# Patient Record
Sex: FEMALE | Race: WHITE | Employment: OTHER | URBAN - METROPOLITAN AREA
[De-identification: names, ages, dates, MRNs, and addresses within clinical notes are randomized per-mention and may not be internally consistent; named-entity substitution may affect disease eponyms.]

---

## 2017-05-12 PROBLEM — M17.0 PRIMARY OSTEOARTHRITIS OF BOTH KNEES: Status: ACTIVE | Noted: 2017-05-12

## 2017-08-04 ENCOUNTER — HOSPITAL ENCOUNTER (OUTPATIENT)
Dept: MRI IMAGING | Facility: HOSPITAL | Age: 67
Discharge: HOME | End: 2017-08-04
Attending: ORTHOPAEDIC SURGERY
Payer: MEDICARE

## 2017-08-04 DIAGNOSIS — S43.402A SPRAIN OF LEFT SHOULDER: ICD-10-CM

## 2017-08-15 ENCOUNTER — HOSPITAL ENCOUNTER (OUTPATIENT)
Dept: MRI IMAGING | Age: 67
Discharge: HOME | End: 2017-08-15
Attending: ORTHOPAEDIC SURGERY
Payer: MEDICARE

## 2017-08-15 PROCEDURE — 73221 MRI JOINT UPR EXTREM W/O DYE: CPT | Performed by: ORTHOPAEDIC SURGERY

## 2017-08-16 NOTE — PROGRESS NOTES
Left shoulder with full thickness rotator cuff tear. Will discuss treatment options in follow up visit. May require surgical repair if no improvement with current treatments.

## 2017-08-31 ENCOUNTER — SURGERY (OUTPATIENT)
Age: 67
End: 2017-08-31

## 2017-08-31 ENCOUNTER — HOSPITAL ENCOUNTER (OUTPATIENT)
Facility: HOSPITAL | Age: 67
Setting detail: HOSPITAL OUTPATIENT SURGERY
Discharge: HOME | End: 2017-08-31
Attending: ORTHOPAEDIC SURGERY | Admitting: ORTHOPAEDIC SURGERY
Payer: MEDICARE

## 2017-08-31 ENCOUNTER — ANESTHESIA (OUTPATIENT)
Dept: SURGERY | Facility: HOSPITAL | Age: 67
End: 2017-08-31

## 2017-08-31 ENCOUNTER — ANESTHESIA EVENT (OUTPATIENT)
Dept: SURGERY | Facility: HOSPITAL | Age: 67
End: 2017-08-31

## 2017-08-31 VITALS
HEART RATE: 77 BPM | SYSTOLIC BLOOD PRESSURE: 129 MMHG | HEIGHT: 64 IN | BODY MASS INDEX: 50.02 KG/M2 | RESPIRATION RATE: 16 BRPM | TEMPERATURE: 98 F | DIASTOLIC BLOOD PRESSURE: 62 MMHG | OXYGEN SATURATION: 94 % | WEIGHT: 293 LBS

## 2017-08-31 DIAGNOSIS — M12.812 ROTATOR CUFF ARTHROPATHY, LEFT: ICD-10-CM

## 2017-08-31 PROCEDURE — 76942 ECHO GUIDE FOR BIOPSY: CPT | Performed by: ORTHOPAEDIC SURGERY

## 2017-08-31 PROCEDURE — 3E0T3BZ INTRODUCTION OF ANESTHETIC AGENT INTO PERIPHERAL NERVES AND PLEXI, PERCUTANEOUS APPROACH: ICD-10-PCS | Performed by: ANESTHESIOLOGY

## 2017-08-31 PROCEDURE — 99152 MOD SED SAME PHYS/QHP 5/>YRS: CPT | Performed by: ORTHOPAEDIC SURGERY

## 2017-08-31 PROCEDURE — 0LQ20ZZ REPAIR LEFT SHOULDER TENDON, OPEN APPROACH: ICD-10-PCS | Performed by: ORTHOPAEDIC SURGERY

## 2017-08-31 PROCEDURE — 64415 NJX AA&/STRD BRCH PLXS IMG: CPT | Performed by: ORTHOPAEDIC SURGERY

## 2017-08-31 DEVICE — ANCHOR SUT 5.5MM 2 FT CRKSCR 2: Type: IMPLANTABLE DEVICE | Site: SHOULDER | Status: FUNCTIONAL

## 2017-08-31 DEVICE — ANCHOR SWIVELOCK AR-2324BCC: Type: IMPLANTABLE DEVICE | Site: SHOULDER | Status: FUNCTIONAL

## 2017-08-31 RX ORDER — CLINDAMYCIN PHOSPHATE 150 MG/ML
INJECTION, SOLUTION INTRAVENOUS AS NEEDED
Status: DISCONTINUED | OUTPATIENT
Start: 2017-08-31 | End: 2017-08-31 | Stop reason: SURG

## 2017-08-31 RX ORDER — CLINDAMYCIN PHOSPHATE 900 MG/50ML
900 INJECTION INTRAVENOUS ONCE
Status: DISCONTINUED | OUTPATIENT
Start: 2017-08-31 | End: 2017-08-31 | Stop reason: HOSPADM

## 2017-08-31 RX ORDER — NALOXONE HYDROCHLORIDE 0.4 MG/ML
80 INJECTION, SOLUTION INTRAMUSCULAR; INTRAVENOUS; SUBCUTANEOUS AS NEEDED
Status: DISCONTINUED | OUTPATIENT
Start: 2017-08-31 | End: 2017-08-31

## 2017-08-31 RX ORDER — ACETAMINOPHEN 325 MG/1
650 TABLET ORAL ONCE
Status: COMPLETED | OUTPATIENT
Start: 2017-08-31 | End: 2017-08-31

## 2017-08-31 RX ORDER — MORPHINE SULFATE 2 MG/ML
2 INJECTION, SOLUTION INTRAMUSCULAR; INTRAVENOUS EVERY 10 MIN PRN
Status: DISCONTINUED | OUTPATIENT
Start: 2017-08-31 | End: 2017-08-31

## 2017-08-31 RX ORDER — HYDROMORPHONE HYDROCHLORIDE 1 MG/ML
0.2 INJECTION, SOLUTION INTRAMUSCULAR; INTRAVENOUS; SUBCUTANEOUS EVERY 5 MIN PRN
Status: DISCONTINUED | OUTPATIENT
Start: 2017-08-31 | End: 2017-08-31

## 2017-08-31 RX ORDER — ROPIVACAINE HYDROCHLORIDE 5 MG/ML
INJECTION, SOLUTION EPIDURAL; INFILTRATION; PERINEURAL AS NEEDED
Status: DISCONTINUED | OUTPATIENT
Start: 2017-08-31 | End: 2017-08-31 | Stop reason: SURG

## 2017-08-31 RX ORDER — METOCLOPRAMIDE 10 MG/1
10 TABLET ORAL ONCE
Status: COMPLETED | OUTPATIENT
Start: 2017-08-31 | End: 2017-08-31

## 2017-08-31 RX ORDER — BUPIVACAINE HYDROCHLORIDE AND EPINEPHRINE 5; 5 MG/ML; UG/ML
INJECTION, SOLUTION PERINEURAL AS NEEDED
Status: DISCONTINUED | OUTPATIENT
Start: 2017-08-31 | End: 2017-08-31 | Stop reason: HOSPADM

## 2017-08-31 RX ORDER — HYDROCODONE BITARTRATE AND ACETAMINOPHEN 5; 325 MG/1; MG/1
1 TABLET ORAL AS NEEDED
Status: DISCONTINUED | OUTPATIENT
Start: 2017-08-31 | End: 2017-08-31

## 2017-08-31 RX ORDER — MORPHINE SULFATE 4 MG/ML
6 INJECTION, SOLUTION INTRAMUSCULAR; INTRAVENOUS EVERY 10 MIN PRN
Status: DISCONTINUED | OUTPATIENT
Start: 2017-08-31 | End: 2017-08-31

## 2017-08-31 RX ORDER — HYDROMORPHONE HYDROCHLORIDE 1 MG/ML
0.6 INJECTION, SOLUTION INTRAMUSCULAR; INTRAVENOUS; SUBCUTANEOUS EVERY 5 MIN PRN
Status: DISCONTINUED | OUTPATIENT
Start: 2017-08-31 | End: 2017-08-31

## 2017-08-31 RX ORDER — SUCCINYLCHOLINE CHLORIDE 20 MG/ML
INJECTION INTRAMUSCULAR; INTRAVENOUS AS NEEDED
Status: DISCONTINUED | OUTPATIENT
Start: 2017-08-31 | End: 2017-08-31 | Stop reason: SURG

## 2017-08-31 RX ORDER — FAMOTIDINE 20 MG/1
20 TABLET ORAL ONCE
Status: COMPLETED | OUTPATIENT
Start: 2017-08-31 | End: 2017-08-31

## 2017-08-31 RX ORDER — LIDOCAINE HYDROCHLORIDE 10 MG/ML
INJECTION, SOLUTION EPIDURAL; INFILTRATION; INTRACAUDAL; PERINEURAL AS NEEDED
Status: DISCONTINUED | OUTPATIENT
Start: 2017-08-31 | End: 2017-08-31 | Stop reason: SURG

## 2017-08-31 RX ORDER — HYDROMORPHONE HYDROCHLORIDE 1 MG/ML
0.4 INJECTION, SOLUTION INTRAMUSCULAR; INTRAVENOUS; SUBCUTANEOUS EVERY 5 MIN PRN
Status: DISCONTINUED | OUTPATIENT
Start: 2017-08-31 | End: 2017-08-31

## 2017-08-31 RX ORDER — SODIUM CHLORIDE, SODIUM LACTATE, POTASSIUM CHLORIDE, CALCIUM CHLORIDE 600; 310; 30; 20 MG/100ML; MG/100ML; MG/100ML; MG/100ML
INJECTION, SOLUTION INTRAVENOUS CONTINUOUS
Status: DISCONTINUED | OUTPATIENT
Start: 2017-08-31 | End: 2017-08-31

## 2017-08-31 RX ORDER — DEXAMETHASONE SODIUM PHOSPHATE 4 MG/ML
VIAL (ML) INJECTION AS NEEDED
Status: DISCONTINUED | OUTPATIENT
Start: 2017-08-31 | End: 2017-08-31 | Stop reason: SURG

## 2017-08-31 RX ORDER — HALOPERIDOL 5 MG/ML
0.25 INJECTION INTRAMUSCULAR ONCE AS NEEDED
Status: DISCONTINUED | OUTPATIENT
Start: 2017-08-31 | End: 2017-08-31

## 2017-08-31 RX ORDER — HYDROCODONE BITARTRATE AND ACETAMINOPHEN 5; 325 MG/1; MG/1
2 TABLET ORAL AS NEEDED
Status: DISCONTINUED | OUTPATIENT
Start: 2017-08-31 | End: 2017-08-31

## 2017-08-31 RX ORDER — ONDANSETRON 2 MG/ML
4 INJECTION INTRAMUSCULAR; INTRAVENOUS ONCE AS NEEDED
Status: DISCONTINUED | OUTPATIENT
Start: 2017-08-31 | End: 2017-08-31

## 2017-08-31 RX ORDER — ONDANSETRON 2 MG/ML
INJECTION INTRAMUSCULAR; INTRAVENOUS AS NEEDED
Status: DISCONTINUED | OUTPATIENT
Start: 2017-08-31 | End: 2017-08-31 | Stop reason: SURG

## 2017-08-31 RX ORDER — MORPHINE SULFATE 4 MG/ML
4 INJECTION, SOLUTION INTRAMUSCULAR; INTRAVENOUS EVERY 10 MIN PRN
Status: DISCONTINUED | OUTPATIENT
Start: 2017-08-31 | End: 2017-08-31

## 2017-08-31 RX ADMIN — DEXAMETHASONE SODIUM PHOSPHATE 4 MG: 4 MG/ML VIAL (ML) INJECTION at 12:25:00

## 2017-08-31 RX ADMIN — LIDOCAINE HYDROCHLORIDE 50 MG: 10 INJECTION, SOLUTION EPIDURAL; INFILTRATION; INTRACAUDAL; PERINEURAL at 12:05:00

## 2017-08-31 RX ADMIN — ONDANSETRON 4 MG: 2 INJECTION INTRAMUSCULAR; INTRAVENOUS at 12:25:00

## 2017-08-31 RX ADMIN — ROPIVACAINE HYDROCHLORIDE 30 ML: 5 INJECTION, SOLUTION EPIDURAL; INFILTRATION; PERINEURAL at 11:54:00

## 2017-08-31 RX ADMIN — CLINDAMYCIN PHOSPHATE 900 MG: 150 INJECTION, SOLUTION INTRAVENOUS at 12:09:00

## 2017-08-31 RX ADMIN — SODIUM CHLORIDE, SODIUM LACTATE, POTASSIUM CHLORIDE, CALCIUM CHLORIDE: 600; 310; 30; 20 INJECTION, SOLUTION INTRAVENOUS at 11:56:00

## 2017-08-31 RX ADMIN — SODIUM CHLORIDE, SODIUM LACTATE, POTASSIUM CHLORIDE, CALCIUM CHLORIDE: 600; 310; 30; 20 INJECTION, SOLUTION INTRAVENOUS at 13:10:00

## 2017-08-31 RX ADMIN — SUCCINYLCHOLINE CHLORIDE 100 MG: 20 INJECTION INTRAMUSCULAR; INTRAVENOUS at 12:05:00

## 2017-08-31 NOTE — ANESTHESIA POSTPROCEDURE EVALUATION
Patient: Aisha Murillo    Procedure Summary     Date:  08/31/17 Room / Location:  [address] [place] [address] MAIN OR 12 / [address] [place] [address] OR    Anesthesia Start:  7726 Anesthesia Stop:      Procedure:  SHOULDER ROTATOR CUFF REPAIR (Left Shoulder) Diagnosis:       Rotator cuff arthro

## 2017-08-31 NOTE — INTERVAL H&P NOTE
Pre-op Diagnosis: Rotator cuff arthropathy, left [M12.812]    The above referenced H&P was reviewed by Cara Maravilla MD on 8/31/2017, the patient was examined and no significant changes have occurred in the patient's condition since the H&P was perfor

## 2017-08-31 NOTE — OPERATIVE REPORT
[facility]    PATIENT'S NAME: DOMINGO DUVAL   ATTENDING PHYSICIAN: Isai Tubbs MD   OPERATING PHYSICIAN: Isai Tubbs MD   PATIENT ACCOUNT#:   [id number]    LOCATION:  [facility] 10  MEDICAL RECORD #:   [id number] upper extremity was prepped and draped in the usual sterile fashion. The procedure was begun with a 4 cm incision over the lateral aspect of the left shoulder starting at just above the acromion process and extending 3 cm distally.   Skin dissection was ta gently compressive dressing was applied, and a sling was applied as she was being awakened. The patient has been transferred to the recovery room in stable condition. Dictated By Lakisha Grace.  Bunny Walker MD  d: 08/31/2017 13:11:53  t: 08/31/2017 16:49:56  J

## 2017-08-31 NOTE — BRIEF OP NOTE
Pre-Operative Diagnosis: Rotator cuff arthropathy, left [M12.812]     Post-Operative Diagnosis: ROTATOR CUFF ARTHROPATHY LEFT     Procedure Performed:   Procedure(s):  LEFT SHOULDER MINI OPEN ROTATOR CUFF REPAIR    Surgeon(s) and Role:     * Shanika March

## 2017-08-31 NOTE — ANESTHESIA PREPROCEDURE EVALUATION
Anesthesia PreOp Note    HPI:     Yovany Andrews is a 74year old female who presents for preoperative consultation requested by: Marisa Sims MD    Date of Surgery: 8/31/2017    Procedure(s):  SHOULDER ROTATOR CUFF REPAIR  Indication: Rotator cuf daily. Disp: 1 tablet Rfl: 0 8/23/2017   Multiple Vitamin (MULTIVITAMINS) Oral Cap Take 1 capsule by mouth daily. Disp:  Rfl:  8/23/2017   ibuprofen (MOTRIN) 200 MG Oral Tab Take  by mouth.  Disp:  Rfl:  8/23/2017   HYDROcodone-acetaminophen  MG Ora blood pressure is 153/85 and her pulse is 79. Her respiration is 16 and oxygen saturation is 95%.     08/31/17  1117 08/31/17  1127 08/31/17  1137 08/31/17  1147   BP: (!) 174/77 130/71 135/71 153/85   BP Location: Right arm Right arm Right arm    Pulse: 75

## 2017-08-31 NOTE — ANESTHESIA PROCEDURE NOTES
Peripheral Block    Anesthesiologist:  Britta De Anda  Performed by:   Anesthesiologist  Patient Location:  PACU  Start Time:  8/31/2017 11:49 AM  End Time:  8/31/2017 11:54 AM  Site Identification: ultrasound guided, real time ultrasound guided, nerve stimu

## 2017-09-25 ENCOUNTER — PATIENT OUTREACH (OUTPATIENT)
Dept: INTERNAL MEDICINE CLINIC | Facility: CLINIC | Age: 67
End: 2017-09-25

## 2017-09-25 ENCOUNTER — HOSPITAL ENCOUNTER (OUTPATIENT)
Dept: PHYSICAL THERAPY | Facility: HOSPITAL | Age: 67
Setting detail: THERAPIES SERIES
Discharge: HOME | End: 2017-09-25
Attending: ORTHOPAEDIC SURGERY
Payer: MEDICARE

## 2017-09-25 DIAGNOSIS — Z98.890 STATUS POST LEFT ROTATOR CUFF REPAIR: ICD-10-CM

## 2017-09-25 PROCEDURE — 97110 THERAPEUTIC EXERCISES: CPT

## 2017-09-25 PROCEDURE — 97162 PT EVAL MOD COMPLEX 30 MIN: CPT

## 2017-09-25 NOTE — PROGRESS NOTES
POST-OP SHOULDER EVALUATION:   Referring Physician: Dr. Bunny Walker  Diagnosis: s/p L mini open RC repair    Date of Service: 9/25/2017     PATIENT Marleny Richardson is a 69year old y/o female who presents to therapy today s/p L mini open RC repair Flexion: R nt; L 90  Abduction: R nt; L 90  ER: R nt; L 30-0 deg abd  IR: R nt; L 45       Accessory motion: muscle guarding L GH joint      Strength/MMT:  Shoulder   Flexion: R 5/5; L nt/5  Abduction: R 5/5; L nt/5  ER: R 5/5; L nt/5  IR: R 5/5; L nt/5 Therapy; Therapeutic Exercises; Neuromuscular Re-education; Therapeutic Activity; Electrical Stim; Ultrasound; Pt education; Home exercise program instructions.     Education or treatment limitation: None  Rehab Potential:good    FOTO: 35/100  Current statu

## 2017-09-27 ENCOUNTER — HOSPITAL ENCOUNTER (OUTPATIENT)
Dept: PHYSICAL THERAPY | Facility: HOSPITAL | Age: 67
Setting detail: THERAPIES SERIES
Discharge: HOME | End: 2017-09-27
Attending: ORTHOPAEDIC SURGERY
Payer: MEDICARE

## 2017-09-27 PROCEDURE — 97140 MANUAL THERAPY 1/> REGIONS: CPT

## 2017-09-27 PROCEDURE — 97110 THERAPEUTIC EXERCISES: CPT

## 2017-09-27 NOTE — ADDENDUM NOTE
Encounter addended by: Pedro Mary PT on: 9/27/2017 12:32 PM<BR>    Actions taken: Sign clinical note

## 2017-09-27 NOTE — PROGRESS NOTES
Dx: s/p L mini open RC repair    Authorized # of Visits:  8 (POC 12)       Next MD visit: none scheduled  Fall Risk: standard         Precautions: n/a             Subjective: Pain 5/10. Still having difficulty with sleeping.     Objective:   PROM

## 2017-09-28 ENCOUNTER — OFFICE VISIT (OUTPATIENT)
Dept: INTERNAL MEDICINE CLINIC | Facility: CLINIC | Age: 67
End: 2017-09-28

## 2017-09-28 ENCOUNTER — LAB ENCOUNTER (OUTPATIENT)
Dept: LAB | Facility: HOSPITAL | Age: 67
End: 2017-09-28
Attending: INTERNAL MEDICINE
Payer: MEDICARE

## 2017-09-28 VITALS
BODY MASS INDEX: 50.02 KG/M2 | TEMPERATURE: 98 F | WEIGHT: 293 LBS | RESPIRATION RATE: 20 BRPM | SYSTOLIC BLOOD PRESSURE: 132 MMHG | HEIGHT: 64 IN | DIASTOLIC BLOOD PRESSURE: 80 MMHG | HEART RATE: 70 BPM

## 2017-09-28 DIAGNOSIS — Z12.11 COLON CANCER SCREENING: ICD-10-CM

## 2017-09-28 DIAGNOSIS — E66.9 OBESITY, UNSPECIFIED OBESITY SEVERITY, UNSPECIFIED OBESITY TYPE: ICD-10-CM

## 2017-09-28 DIAGNOSIS — I10 ESSENTIAL HYPERTENSION WITH GOAL BLOOD PRESSURE LESS THAN 140/90: ICD-10-CM

## 2017-09-28 DIAGNOSIS — M17.0 PRIMARY OSTEOARTHRITIS OF BOTH KNEES: ICD-10-CM

## 2017-09-28 DIAGNOSIS — G47.30 SLEEP APNEA, UNSPECIFIED TYPE: ICD-10-CM

## 2017-09-28 DIAGNOSIS — Z00.00 ENCOUNTER FOR ANNUAL HEALTH EXAMINATION: Primary | ICD-10-CM

## 2017-09-28 DIAGNOSIS — M75.102 TEAR OF LEFT ROTATOR CUFF, UNSPECIFIED TEAR EXTENT: ICD-10-CM

## 2017-09-28 LAB
ALBUMIN SERPL BCP-MCNC: 3.8 G/DL (ref 3.5–4.8)
ALBUMIN/GLOB SERPL: 1.2 {RATIO} (ref 1–2)
ALP SERPL-CCNC: 59 U/L (ref 32–100)
ALT SERPL-CCNC: 15 U/L (ref 14–54)
ANION GAP SERPL CALC-SCNC: 10 MMOL/L (ref 0–18)
AST SERPL-CCNC: 20 U/L (ref 15–41)
BASOPHILS # BLD: 0 K/UL (ref 0–0.2)
BASOPHILS NFR BLD: 1 %
BILIRUB SERPL-MCNC: 0.9 MG/DL (ref 0.3–1.2)
BUN SERPL-MCNC: 12 MG/DL (ref 8–20)
BUN/CREAT SERPL: 16.2 (ref 10–20)
CALCIUM SERPL-MCNC: 9.7 MG/DL (ref 8.5–10.5)
CHLORIDE SERPL-SCNC: 100 MMOL/L (ref 95–110)
CHOLEST SERPL-MCNC: 171 MG/DL (ref 110–200)
CO2 SERPL-SCNC: 31 MMOL/L (ref 22–32)
CREAT SERPL-MCNC: 0.74 MG/DL (ref 0.5–1.5)
EOSINOPHIL # BLD: 0.3 K/UL (ref 0–0.7)
EOSINOPHIL NFR BLD: 3 %
ERYTHROCYTE [DISTWIDTH] IN BLOOD BY AUTOMATED COUNT: 13.8 % (ref 11–15)
GLOBULIN PLAS-MCNC: 3.2 G/DL (ref 2.5–3.7)
GLUCOSE SERPL-MCNC: 99 MG/DL (ref 70–99)
HCT VFR BLD AUTO: 44.2 % (ref 35–48)
HDLC SERPL-MCNC: 56 MG/DL
HGB BLD-MCNC: 15 G/DL (ref 12–16)
LDLC SERPL CALC-MCNC: 101 MG/DL (ref 0–99)
LYMPHOCYTES # BLD: 1.7 K/UL (ref 1–4)
LYMPHOCYTES NFR BLD: 19 %
MCH RBC QN AUTO: 30.2 PG (ref 27–32)
MCHC RBC AUTO-ENTMCNC: 34 G/DL (ref 32–37)
MCV RBC AUTO: 88.7 FL (ref 80–100)
MONOCYTES # BLD: 0.8 K/UL (ref 0–1)
MONOCYTES NFR BLD: 8 %
NEUTROPHILS # BLD AUTO: 6.3 K/UL (ref 1.8–7.7)
NEUTROPHILS NFR BLD: 69 %
NONHDLC SERPL-MCNC: 115 MG/DL
OSMOLALITY UR CALC.SUM OF ELEC: 292 MOSM/KG (ref 275–295)
PLATELET # BLD AUTO: 183 K/UL (ref 140–400)
PMV BLD AUTO: 8.5 FL (ref 7.4–10.3)
POTASSIUM SERPL-SCNC: 3.4 MMOL/L (ref 3.3–5.1)
PROT SERPL-MCNC: 7 G/DL (ref 5.9–8.4)
RBC # BLD AUTO: 4.98 M/UL (ref 3.7–5.4)
SODIUM SERPL-SCNC: 141 MMOL/L (ref 136–144)
TRIGL SERPL-MCNC: 69 MG/DL (ref 1–149)
TSH SERPL-ACNC: 2.42 UIU/ML (ref 0.45–5.33)
WBC # BLD AUTO: 9.1 K/UL (ref 4–11)

## 2017-09-28 PROCEDURE — 80061 LIPID PANEL: CPT

## 2017-09-28 PROCEDURE — 84443 ASSAY THYROID STIM HORMONE: CPT

## 2017-09-28 PROCEDURE — 80053 COMPREHEN METABOLIC PANEL: CPT

## 2017-09-28 PROCEDURE — 85025 COMPLETE CBC W/AUTO DIFF WBC: CPT

## 2017-09-28 PROCEDURE — 36415 COLL VENOUS BLD VENIPUNCTURE: CPT

## 2017-09-28 PROCEDURE — G0439 PPPS, SUBSEQ VISIT: HCPCS | Performed by: INTERNAL MEDICINE

## 2017-09-28 RX ORDER — VALSARTAN AND HYDROCHLOROTHIAZIDE 320; 25 MG/1; MG/1
1 TABLET, FILM COATED ORAL DAILY
Qty: 90 TABLET | Refills: 3 | Status: SHIPPED | OUTPATIENT
Start: 2017-09-28 | End: 2018-10-01

## 2017-09-28 RX ORDER — INFLUENZA A VIRUSA/MICHIGAN/45/2015 X-275 (H1N1) ANTIGEN (FORMALDEHYDE INACTIVATED), INFLUENZA A VIRUS A/HONG KONG/4801/2014 X-263B (H3N2) ANTIGEN (FORMALDEHYDE INACTIVATED), AND INFLUENZA B VIRUS B/BRISBANE/60/2008 ANTIGEN (FORMALDEHYDE INACTIVATED) 60; 60; 60 UG/.5ML; UG/.5ML; UG/.5ML
INJECTION, SUSPENSION INTRAMUSCULAR
Refills: 0 | COMMUNITY
Start: 2017-09-26 | End: 2017-09-28 | Stop reason: ALTCHOICE

## 2017-09-28 RX ORDER — AMLODIPINE BESYLATE 10 MG/1
10 TABLET ORAL DAILY
Qty: 90 TABLET | Refills: 3 | Status: SHIPPED | OUTPATIENT
Start: 2017-09-28 | End: 2018-10-01

## 2017-09-28 RX ORDER — PNEUMOCOCCAL VACCINE POLYVALENT 25; 25; 25; 25; 25; 25; 25; 25; 25; 25; 25; 25; 25; 25; 25; 25; 25; 25; 25; 25; 25; 25; 25 UG/.5ML; UG/.5ML; UG/.5ML; UG/.5ML; UG/.5ML; UG/.5ML; UG/.5ML; UG/.5ML; UG/.5ML; UG/.5ML; UG/.5ML; UG/.5ML; UG/.5ML; UG/.5ML; UG/.5ML; UG/.5ML; UG/.5ML; UG/.5ML; UG/.5ML; UG/.5ML; UG/.5ML; UG/.5ML; UG/.5ML
INJECTION, SOLUTION INTRAMUSCULAR; SUBCUTANEOUS
Refills: 0 | COMMUNITY
Start: 2017-09-26 | End: 2017-09-28 | Stop reason: ALTCHOICE

## 2017-09-28 NOTE — PROGRESS NOTES
HPI:   Erica Keating is a 69year old female who presents for a Medicare Subsequent Annual Wellness visit (Pt already had Initial Annual Wellness).     Patient is here for Medicare annual wellness visit as well as follow-up on chronic medical issues as succinate [metoprolol]; benazepril; estrogens, conjugated; and penicillin g.     CURRENT MEDICATIONS:     Outpatient Prescriptions Marked as Taking for the 9/28/17 encounter (Office Visit) with Ion Ventura MD:  Acetaminophen-Codeine #3 300-30 MG Oral T exertion  GI: denies abdominal pain, denies heartburn  : denies dysuria, vaginal discharge or itching, no complaint of urinary incontinence   MUSCULOSKELETAL: denies back pain  NEURO: denies headaches  PSYCHE: denies depression or anxiety  HEMATOLOGIC: d Family members and friends have told me they think I may have hearing loss:   Yes            Visual Acuity  Right Eye Visual Acuity: Corrected Right Eye Chart Acuity: 20/25   Left Eye Visual Acuity: Corrected Left Eye Chart Acuity: 20/25   Both Eyes Visua CHRONIC CONDITIONS:   Aishwarya Spencer is a 69year old female who presents for a Medicare Assessment.      PLAN SUMMARY:   (Z00.00) Encounter for annual health examination  (primary encounter diagnosis)  Plan: Physical exam today unremarkable other than ob Epic:    Claudiaroger Buchanandy has a Power of  for [employer] on file in [facility] PLAN:  The patient indicates understanding of these issues and agrees to the plan. No Follow-up on file.      Ray Baez MD, 9/28/2017     [facility]    Scoring Interpretation: 4+ At Risk     Depression Screening (PHQ-2/PHQ-9): Over the LAST 2 WEEKS   Little interest or pleasure in doing things (over the last two weeks)?: Several days    Feeling down, depressed, or hopeless (over the last two years No results found for this or any previous visit. No flowsheet data found.     Pap and Pelvic      Pap: Every 3 yrs age 21-68 or Pap+HPV every 5 yrs age 33-67, age 72 and older at high risk There are no preventive care reminders to display for this pat flowsheet data found. BUN  Annually BUN (mg/dL)   Date Value   09/26/2016 14    No flowsheet data found. Drug Serum Conc  Annually No results found for: DIGOXIN, DIG, VALP No flowsheet data found.     Diabetes      HgbA1C  Annually No results found f

## 2017-09-28 NOTE — PATIENT INSTRUCTIONS
Rohini Wolff's SCREENING SCHEDULE   Tests on this list are recommended by your physician but may not be covered, or covered at this frequency, by your insurer. Please check with your insurance carrier before scheduling to verify coverage.    YARI more often if abnormal Colonoscopy,10 Years due on 09/15/1998 Update Health Maintenance if applicable    Flex Sigmoidoscopy Screen  Covered every 5 years No results found for this or any previous visit. No flowsheet data found.      Fecal Occult Blood   Cov (Pneumovax)  Covered Once after 65 No orders found for this or any previous visit. Please get once after your 65th birthday    Hepatitis B for Moderate/High Risk       No orders found for this or any previous visit.  Medium/high risk factors:   End-stage re

## 2017-10-02 ENCOUNTER — HOSPITAL ENCOUNTER (OUTPATIENT)
Dept: PHYSICAL THERAPY | Facility: HOSPITAL | Age: 67
Setting detail: THERAPIES SERIES
Discharge: HOME | End: 2017-10-02
Attending: ORTHOPAEDIC SURGERY
Payer: MEDICARE

## 2017-10-02 PROCEDURE — 97140 MANUAL THERAPY 1/> REGIONS: CPT

## 2017-10-02 PROCEDURE — 97110 THERAPEUTIC EXERCISES: CPT

## 2017-10-02 NOTE — PROGRESS NOTES
Dx: s/p L mini open RC repair    Authorized # of Visits:  8 (POC 12)       Next MD visit: none scheduled  Fall Risk: standard         Precautions: n/a             Subjective: Pain 4/10. Still having difficulty with sleeping.  Felt better after physical ther

## 2017-10-04 ENCOUNTER — HOSPITAL ENCOUNTER (OUTPATIENT)
Dept: PHYSICAL THERAPY | Facility: HOSPITAL | Age: 67
Setting detail: THERAPIES SERIES
Discharge: HOME | End: 2017-10-04
Attending: ORTHOPAEDIC SURGERY
Payer: MEDICARE

## 2017-10-04 PROCEDURE — 97140 MANUAL THERAPY 1/> REGIONS: CPT

## 2017-10-04 PROCEDURE — 97110 THERAPEUTIC EXERCISES: CPT

## 2017-10-04 NOTE — PROGRESS NOTES
Dx: s/p L mini open RC repair    Authorized # of Visits:  8 (POC 12)       Next MD visit: none scheduled  Fall Risk: standard         Precautions: n/a             Subjective: Pain 3-4/10. Slept better last night.     Objective:   PROM            9/25/17 9/

## 2017-10-10 ENCOUNTER — HOSPITAL ENCOUNTER (OUTPATIENT)
Dept: PHYSICAL THERAPY | Facility: HOSPITAL | Age: 67
Setting detail: THERAPIES SERIES
Discharge: HOME | End: 2017-10-10
Attending: ORTHOPAEDIC SURGERY
Payer: MEDICARE

## 2017-10-10 PROCEDURE — 97110 THERAPEUTIC EXERCISES: CPT

## 2017-10-10 PROCEDURE — 97140 MANUAL THERAPY 1/> REGIONS: CPT

## 2017-10-10 NOTE — PROGRESS NOTES
Dx: s/p L mini open RC repair    Authorized # of Visits:  8 (POC 12)       Next MD visit: none scheduled  Fall Risk: standard         Precautions: n/a             Subjective: Pain 6/10 L shoulder. B knee pain 8/10-arthritis pain. Carried glass of water.  Sa Finger ladder x10 Finger ladder x10      Sleeping positions Sleeping positions-  suggestingbed with pillows  Pulleys x5 min Pulleys x3 min    KT L bicep- facilitative        Assessment:  Progressed to AAROM and AROM .  Pt required frequent reminders for sca

## 2017-10-11 ENCOUNTER — PATIENT OUTREACH (OUTPATIENT)
Dept: INTERNAL MEDICINE CLINIC | Facility: CLINIC | Age: 67
End: 2017-10-11

## 2017-10-11 PROBLEM — Z47.89 ORTHOPEDIC AFTERCARE: Status: ACTIVE | Noted: 2017-10-11

## 2017-10-11 NOTE — PROGRESS NOTES
Outreached to patient in regards to enrollment to Chronic Care Management program. Not interested, feels no need.

## 2017-10-12 ENCOUNTER — HOSPITAL ENCOUNTER (OUTPATIENT)
Dept: PHYSICAL THERAPY | Facility: HOSPITAL | Age: 67
Setting detail: THERAPIES SERIES
Discharge: HOME | End: 2017-10-12
Attending: ORTHOPAEDIC SURGERY
Payer: MEDICARE

## 2017-10-12 PROCEDURE — 97110 THERAPEUTIC EXERCISES: CPT

## 2017-10-12 PROCEDURE — 97140 MANUAL THERAPY 1/> REGIONS: CPT

## 2017-10-12 NOTE — PROGRESS NOTES
Dx: s/p L mini open RC repair    Authorized # of Visits:  8 (POC 12)       Next MD visit: none scheduled  Fall Risk: standard         Precautions: n/a             Subjective: Pain 6/10 L shoulder. Able to sleep in bed for part of the night. Put cup away. x30-on table Standing-  Ball S/S, F/B x30-on table Standing-  Ball S/S, F/B x30-on table FW bent-B shoulder   h abd x10  (HEP) AAROM sh flexion > eccentric lowering w assistance     Supine-  AAROM cane  Bench press x10  IR/ER x10  (HEP) Pulleys x5 min Fing

## 2017-10-16 ENCOUNTER — HOSPITAL ENCOUNTER (OUTPATIENT)
Dept: PHYSICAL THERAPY | Facility: HOSPITAL | Age: 67
Setting detail: THERAPIES SERIES
Discharge: HOME | End: 2017-10-16
Attending: ORTHOPAEDIC SURGERY
Payer: MEDICARE

## 2017-10-16 PROCEDURE — 97140 MANUAL THERAPY 1/> REGIONS: CPT

## 2017-10-16 PROCEDURE — 97110 THERAPEUTIC EXERCISES: CPT

## 2017-10-16 NOTE — PROGRESS NOTES
Dx: s/p L mini open RC repair    Authorized # of Visits:  8 (POC 12)       Next MD visit: none scheduled  Fall Risk: standard         Precautions: n/a             Subjective: Pain: 3/10 L shoulder Still having difficulty sleeping. Able to put deodorant on. and rotation L c-spine upper trap, levator scap stretch 20 sec x2  PROM SB and rotation L c-spine Ball up wall x10    scap retraction YTB x15  (HEP-issued YTB) Ball up wall x10    scap retraction GTB x15 Ball up wall 2x10    doorway stretch 20 sec x3    Wa

## 2017-10-18 ENCOUNTER — HOSPITAL ENCOUNTER (OUTPATIENT)
Dept: PHYSICAL THERAPY | Facility: HOSPITAL | Age: 67
Setting detail: THERAPIES SERIES
Discharge: HOME | End: 2017-10-18
Attending: ORTHOPAEDIC SURGERY
Payer: MEDICARE

## 2017-10-18 PROCEDURE — 97110 THERAPEUTIC EXERCISES: CPT

## 2017-10-18 PROCEDURE — 97140 MANUAL THERAPY 1/> REGIONS: CPT

## 2017-10-18 NOTE — PROGRESS NOTES
Progress Summary    Pt has attended 8, cancelled 0, and no shown 0 visits in Physical Therapy.        Dx: s/p L mini open RC repair        Assessment: Pt has made significant improvement in physical therapy with increased ROM, strength, and function with r overhead-PROGRESSING  · Pt will demonstrate increased mid/low trap strength to 4+/5 to promote improved shoulder mechanics and stabilization with ADL such as lifting and reaching (12 visits)-NOT TESTED TODAY  · Pt will be independent and compliant with com care.    X___________________________________________________ Date____________________    Certification From: 80/97/0228  To:1/16/2018          Date: 9/27/2017  Tx#: 2/12 Date: 10/2  Tx#: 3/ Date: 10/4  Tx#: 4/ Date: 10/10  Tx#: 5/ Date: 10/12  Tx#: 6/ Timoteo x15  (HEP-issued YTB) Ball up wall x10    scap retraction GTB x15    Bench  Press x10 Ball up wall 2x10    doorway stretch 20 sec x3    Wall pushups x10    scap retraction GTB x15    YTB IR, ER x10     Bench press 1# 2x10 Ball up wall 2x10    doorway stret

## 2017-10-23 ENCOUNTER — HOSPITAL ENCOUNTER (OUTPATIENT)
Dept: PHYSICAL THERAPY | Facility: HOSPITAL | Age: 67
Setting detail: THERAPIES SERIES
Discharge: HOME | End: 2017-10-23
Attending: ORTHOPAEDIC SURGERY
Payer: MEDICARE

## 2017-10-23 ENCOUNTER — APPOINTMENT (OUTPATIENT)
Dept: PHYSICAL THERAPY | Facility: HOSPITAL | Age: 67
End: 2017-10-23
Attending: ORTHOPAEDIC SURGERY
Payer: MEDICARE

## 2017-10-23 PROCEDURE — 97140 MANUAL THERAPY 1/> REGIONS: CPT

## 2017-10-23 PROCEDURE — 97110 THERAPEUTIC EXERCISES: CPT

## 2017-10-23 NOTE — PROGRESS NOTES
Dx: s/p L mini open RC repair        Subjective: Pain: 4/10 L shoulder Still having difficulty sleeping. Able to put deodorant and reach to first shelf. L shoulder always achy.     Objective:   PROM            9/25/17  9/27 10/2   10/18   Flexion: fl/ext 2# x20    scap retraction x30-w manual resistance Elbow fl/ext 2# x20    scap retraction x30-w manual resistance Standing-  Shoulder flexion x10  (hep)    Shoulder scaption x10  (hep)    B shoulder extension x10  (hep)    IR with towel x10  (hep) St cueing    Goals: (To be met in 12 visits)   · Pt will report improved ability to sleep without waking due to shoulder pain-NOT MET  · Pt will improve shoulder flexion AROM to >150 degrees to be able to reach into overhead cabinets without pain or restricti

## 2017-10-25 ENCOUNTER — HOSPITAL ENCOUNTER (OUTPATIENT)
Dept: PHYSICAL THERAPY | Facility: HOSPITAL | Age: 67
Setting detail: THERAPIES SERIES
Discharge: HOME | End: 2017-10-25
Attending: ORTHOPAEDIC SURGERY
Payer: MEDICARE

## 2017-10-25 ENCOUNTER — APPOINTMENT (OUTPATIENT)
Dept: PHYSICAL THERAPY | Facility: HOSPITAL | Age: 67
End: 2017-10-25
Attending: ORTHOPAEDIC SURGERY
Payer: MEDICARE

## 2017-10-25 PROCEDURE — 97110 THERAPEUTIC EXERCISES: CPT

## 2017-10-25 PROCEDURE — 97140 MANUAL THERAPY 1/> REGIONS: CPT

## 2017-10-25 NOTE — PROGRESS NOTES
Dx: s/p L mini open RC repair        Subjective: Pain:3/10 L shoulder Had less pain throughout the day yesterday and even had less pain while sleeping last night.     Objective:   PROM            9/25/17  9/27 10/2   10/18   Flexion:           L 90  110 140 2#    SL- L scaption 2x10 2#    Prone-sh extension x10 2#   Elbow fl/ext 2# x15    scap retraction x30-w manual resistance Elbow fl/ext 2# x20    scap retraction x30-w manual resistance Elbow fl/ext 2# x20    scap retraction x30-w manual resistance Standin -   Supine-  AAROM cane  Bench press x10  IR/ER x10  (HEP) Pulleys x5 min Finger ladder x10 Finger ladder x10   x10   x10   x10   x10   x10   Sleeping positions Sleeping positions-  suggestingbed with pillows  Pulleys x5 min Pulleys x3 min    KT L bicep- f flexibility/mobility. Stretching/flexibility training for improved mobility. Strengthening exercises to assist patient's return to normal activities of daily living. Patient education in progressive home exercise program as appropriate.          Charges:

## 2017-10-30 ENCOUNTER — HOSPITAL ENCOUNTER (OUTPATIENT)
Dept: PHYSICAL THERAPY | Facility: HOSPITAL | Age: 67
Setting detail: THERAPIES SERIES
Discharge: HOME | End: 2017-10-30
Attending: ORTHOPAEDIC SURGERY
Payer: MEDICARE

## 2017-10-30 PROCEDURE — 97110 THERAPEUTIC EXERCISES: CPT

## 2017-10-30 PROCEDURE — 97140 MANUAL THERAPY 1/> REGIONS: CPT

## 2017-10-30 NOTE — PROGRESS NOTES
Dx: s/p L mini open RC repair        Subjective: Pain:5/10 L shoulder Pain had reduced to lower than 2/10 but over the weekend did exercises incorrectly. Has already self corrected.     Objective:   PROM            9/25/17  9/27 10/2   10/18   Flexion: 2x10    SL- L scaption x10 SL- L ER 1x10  1x10 1#    SL- L scaption 2x10 SL- L ER 1x10  2x10 1#    SL- L scaption 2x10 1# SL- L ER   2x10 2#    SL- L scaption 2x10 2# SL- L ER   2x10 2#    SL- L scaption 2x10 2# SL- L ER   2x10 2#    SL- L scaption 2x10 2# ea    bench press 2# 2x10 Ball up wall x20 overpressure    doorway stretch 20 sec x3    Wall pushups x20    Wall scrubs 2x10 CW, CCW    scap retraction GTB x20    GTB IR, ER x20 ea      bench press 3# 2x10    Standing-  Ball S/S, F/B x30-on table function with ADLs including lifting 5# overhead-PROGRESSING  · Pt will demonstrate increased mid/low trap strength to 4+/5 to promote improved shoulder mechanics and stabilization with ADL such as lifting and reaching (12 visits)-NOT TESTED TODAY  · Pt wi

## 2017-11-01 ENCOUNTER — APPOINTMENT (OUTPATIENT)
Dept: PHYSICAL THERAPY | Facility: HOSPITAL | Age: 67
End: 2017-11-01
Attending: ORTHOPAEDIC SURGERY
Payer: MEDICARE

## 2017-11-06 ENCOUNTER — HOSPITAL ENCOUNTER (OUTPATIENT)
Dept: PHYSICAL THERAPY | Facility: HOSPITAL | Age: 67
Setting detail: THERAPIES SERIES
End: 2017-11-06
Attending: ORTHOPAEDIC SURGERY
Payer: MEDICARE

## 2017-11-06 PROCEDURE — 97110 THERAPEUTIC EXERCISES: CPT

## 2017-11-06 PROCEDURE — 97140 MANUAL THERAPY 1/> REGIONS: CPT

## 2017-11-06 NOTE — PROGRESS NOTES
Discharge Summary    Pt has attended 12, cancelled 0, and no shown 0 visits in Physical Therapy. Dx: s/p L mini open RC repair        Subjective: Minimal pain.  [place] to do activities of daily living with less difficulty including reaching up to shelves 70 deg to be able to reach in back pocket, tuck in shirt, and turn steering wheel without pain-MET  · Pt will improve shoulder strength throughout to 4+/5 to improve function with ADLs including lifting 5# overhead-ALMOST MET  · Pt will demonstrate increas L bicep tendon/  deltoid UBE L3 3 min FW/3 min BW    P/AAROM L shoulder    STM L bicep tendon/  deltoid UBE L3 3 min FW/3 min BW    P/AAROM L shoulder    STM L bicep tendon/  Deltoid    ER and IR overpressure standing x10 ea UBE L3 3 min FW/3 min BW    P/A x2  PROM SB and rotation L c-spine Ball up wall x10    scap retraction YTB x15  (HEP-issued YTB) Ball up wall x10    scap retraction GTB x15    Bench  Press x10 Ball up wall 2x10    doorway stretch 20 sec x3    Wall pushups x10    scap retraction GTB x15 -- - Reviewed HEP       Charges: TEx2, MT   Total Timed Treatment: 45 min     Total Treatment Time: 45 min

## 2017-12-14 ENCOUNTER — APPOINTMENT (OUTPATIENT)
Dept: LAB | Age: 67
End: 2017-12-14
Attending: INTERNAL MEDICINE
Payer: MEDICARE

## 2017-12-14 DIAGNOSIS — Z12.11 COLON CANCER SCREENING: ICD-10-CM

## 2017-12-14 PROCEDURE — 82274 ASSAY TEST FOR BLOOD FECAL: CPT

## 2018-04-13 ENCOUNTER — TELEPHONE (OUTPATIENT)
Dept: INTERNAL MEDICINE CLINIC | Facility: CLINIC | Age: 68
End: 2018-04-13

## 2018-04-13 NOTE — TELEPHONE ENCOUNTER
PATIENT DROPPED OFF PARKING PLACARD. PATIENT WOULD LIKE IT MAILED TO [facility]. PLEASE CALL HER TO CONFIRM.

## 2018-05-10 ENCOUNTER — OFFICE VISIT (OUTPATIENT)
Dept: DERMATOLOGY CLINIC | Facility: CLINIC | Age: 68
End: 2018-05-10

## 2018-05-10 DIAGNOSIS — L72.0 EPIDERMAL CYST: Primary | ICD-10-CM

## 2018-05-10 DIAGNOSIS — L71.9 ROSACEA: ICD-10-CM

## 2018-05-10 PROCEDURE — 99213 OFFICE O/P EST LOW 20 MIN: CPT | Performed by: DERMATOLOGY

## 2018-05-10 PROCEDURE — G0463 HOSPITAL OUTPT CLINIC VISIT: HCPCS | Performed by: DERMATOLOGY

## 2018-05-10 RX ORDER — METRONIDAZOLE 7.5 MG/G
GEL TOPICAL
Qty: 45 G | Refills: 3 | Status: ON HOLD | OUTPATIENT
Start: 2018-05-10 | End: 2021-08-02

## 2018-05-10 RX ORDER — SULFACETAMIDE SODIUM 100 MG/ML
1 LOTION TOPICAL DAILY
Qty: 1 BOTTLE | Refills: 3 | Status: ON HOLD | OUTPATIENT
Start: 2018-05-10 | End: 2021-08-02

## 2018-05-10 NOTE — PROGRESS NOTES
HPI:     Chief Complaint     Lesion; Rosacea        HPI     Lesion    Additional comments: Medicare pt here today for spot check on lesion/nodule located on RT side of jawline/neck. Present for a few months. Denies growth or change to area.  No personal of FLUSHING    Comment:Other reaction(s): Flushing  Estrogens, Conjugat*    BLEEDING    Comment:Other reaction(s): ESTROGENS, CONJUGATED             headaches  Penicillin G            HIVES    Comment:Other reaction(s): PENICILLIN G POTASSIUM    Past Medical line just lateral to  the chin. 2.  There is some erythema and edema of the nose as well as some small inflammatory appearing papules  3.   No other suspicious lesions noted on face or neck      ASSESSMENT/PLAN:   Epidermal cyst  (primary encounter diagnos

## 2018-10-01 ENCOUNTER — LAB ENCOUNTER (OUTPATIENT)
Dept: LAB | Facility: HOSPITAL | Age: 68
End: 2018-10-01
Attending: INTERNAL MEDICINE
Payer: MEDICARE

## 2018-10-01 ENCOUNTER — OFFICE VISIT (OUTPATIENT)
Dept: INTERNAL MEDICINE CLINIC | Facility: CLINIC | Age: 68
End: 2018-10-01
Payer: MEDICARE

## 2018-10-01 VITALS
RESPIRATION RATE: 20 BRPM | DIASTOLIC BLOOD PRESSURE: 78 MMHG | WEIGHT: 291.31 LBS | SYSTOLIC BLOOD PRESSURE: 128 MMHG | TEMPERATURE: 98 F | HEIGHT: 64.5 IN | HEART RATE: 60 BPM | BODY MASS INDEX: 49.13 KG/M2

## 2018-10-01 DIAGNOSIS — G47.30 SLEEP APNEA, UNSPECIFIED TYPE: ICD-10-CM

## 2018-10-01 DIAGNOSIS — E66.9 OBESITY, UNSPECIFIED OBESITY SEVERITY, UNSPECIFIED OBESITY TYPE: ICD-10-CM

## 2018-10-01 DIAGNOSIS — I10 ESSENTIAL HYPERTENSION WITH GOAL BLOOD PRESSURE LESS THAN 140/90: ICD-10-CM

## 2018-10-01 DIAGNOSIS — Z12.11 COLON CANCER SCREENING: ICD-10-CM

## 2018-10-01 DIAGNOSIS — Z00.00 ENCOUNTER FOR ANNUAL HEALTH EXAMINATION: Primary | ICD-10-CM

## 2018-10-01 PROCEDURE — 80053 COMPREHEN METABOLIC PANEL: CPT

## 2018-10-01 PROCEDURE — 80061 LIPID PANEL: CPT

## 2018-10-01 PROCEDURE — 82607 VITAMIN B-12: CPT

## 2018-10-01 PROCEDURE — 84443 ASSAY THYROID STIM HORMONE: CPT

## 2018-10-01 PROCEDURE — G0439 PPPS, SUBSEQ VISIT: HCPCS | Performed by: INTERNAL MEDICINE

## 2018-10-01 PROCEDURE — 36415 COLL VENOUS BLD VENIPUNCTURE: CPT

## 2018-10-01 PROCEDURE — 85025 COMPLETE CBC W/AUTO DIFF WBC: CPT

## 2018-10-01 RX ORDER — PREDNISONE 20 MG/1
40 TABLET ORAL
COMMUNITY
Start: 2011-11-19 | End: 2018-10-01 | Stop reason: ALTCHOICE

## 2018-10-01 RX ORDER — ALBUTEROL SULFATE 90 UG/1
2 AEROSOL, METERED RESPIRATORY (INHALATION)
COMMUNITY
Start: 2011-11-19 | End: 2018-10-01 | Stop reason: ALTCHOICE

## 2018-10-01 RX ORDER — AMLODIPINE BESYLATE 2.5 MG/1
2.5 TABLET ORAL
COMMUNITY
End: 2018-10-01

## 2018-10-01 RX ORDER — AMLODIPINE BESYLATE 10 MG/1
10 TABLET ORAL DAILY
Qty: 90 TABLET | Refills: 3 | Status: SHIPPED | OUTPATIENT
Start: 2018-10-01 | End: 2019-10-04

## 2018-10-01 RX ORDER — VALSARTAN AND HYDROCHLOROTHIAZIDE 320; 25 MG/1; MG/1
1 TABLET, FILM COATED ORAL DAILY
Qty: 90 TABLET | Refills: 3 | Status: SHIPPED | OUTPATIENT
Start: 2018-10-01 | End: 2019-10-04

## 2018-10-01 NOTE — PATIENT INSTRUCTIONS
Rohini Wolff's SCREENING SCHEDULE   Tests on this list are recommended by your physician but may not be covered, or covered at this frequency, by your insurer. Please check with your insurance carrier before scheduling to verify coverage.    YARI Colorectal Cancer Screening  Covered up to Age 76     Colonoscopy Screen   Covered every 10 years- more often if abnormal Colonoscopy due on 09/15/1948 Update Health Maintenance if applicable    Flex Sigmoidoscopy Screen  Covered every 5 years No results any previous visit. Please get once after your 65th birthday    Pneumococcal 23 (Pneumovax)  Covered Once after 65 No orders found for this or any previous visit.  Please get once after your 65th birthday    Hepatitis B for Moderate/High Risk       No order

## 2018-10-01 NOTE — PROGRESS NOTES
HPI:   Mulu Suarez is a 77year old female who presents for a Medicare Subsequent Annual Wellness visit (Pt already had Initial Annual Wellness). Patient is here for Medicare annual wellness visit. Last seen here 1 year ago for the same reason. the past but quit greater than 12 months ago.   Social History    Tobacco Use      Smoking status: Former Smoker        Packs/day: 2.00        Years: 22.00        Pack years: 40        Quit date: 1985        Years since quittin.1      Smokeless to AmLODIPine Besylate (NORVASC) 10 MG Oral Tab Take 1 tablet (10 mg total) by mouth daily. aspirin 81 MG Oral Tab Take 1 tablet by mouth daily. Multiple Vitamin (MULTIVITAMINS) Oral Cap Take 1 capsule by mouth daily.      ibuprofen (MOTRIN) 200 MG Oral is not nervous/anxious.            EXAM:   /78 (BP Location: Right arm, Patient Position: Sitting, Cuff Size: large)   Pulse 60   Temp 98.4 °F (36.9 °C) (Oral)   Resp 20   Ht 5' 4.5\" (1.638 m)   Wt 291 lb 4.8 oz (132.1 kg)   BMI 49.23 kg/m²  Estimate Tolerate Visual Acuity: Yes      Physical Exam    Constitutional: She is obese. She appears well-developed and well-nourished.    HENT:   Right Ear: Tympanic membrane and ear canal normal.   Left Ear: Tympanic membrane and ear canal normal.   Nose: Nose nor as below. Health maintenance issues reviewed. Advanced directives in place. Encouraged diet, exercise. She has lost significant weight over the past few years. Encouraged to continue with this trend.   Patient continues to decline mammogram.  Discussed do you maintain positive mental well-being?: Social Interaction;Puzzles; Visiting Friends;Games; Visiting Family      This section provided for quick review of chart, separate sheet to patient  [address] Internal Lab or Annually No vaccine history found Please get every year    Pneumococcal 13 (Prevnar)  Covered Once after 65 09/17/2016 Please get once after your 65th birthday    Pneumococcal 23 (Pneumovax)  Covered Once after 65 09/26/2017 Please get once after your 65th

## 2018-10-10 ENCOUNTER — TELEPHONE (OUTPATIENT)
Dept: OTHER | Age: 68
End: 2018-10-10

## 2018-10-10 NOTE — TELEPHONE ENCOUNTER
Pt states she has been receiving messages that she hasn't reviewed her test results on [employer]. Pt states she has seen her results, not sure if she saw NITESH MOREIRA message. Reviewed message as noted below.  Pt states she has no questions about her results

## 2018-12-10 ENCOUNTER — APPOINTMENT (OUTPATIENT)
Dept: LAB | Age: 68
End: 2018-12-10
Attending: INTERNAL MEDICINE
Payer: MEDICARE

## 2018-12-10 DIAGNOSIS — E66.9 OBESITY, UNSPECIFIED OBESITY SEVERITY, UNSPECIFIED OBESITY TYPE: ICD-10-CM

## 2018-12-10 DIAGNOSIS — I10 ESSENTIAL HYPERTENSION WITH GOAL BLOOD PRESSURE LESS THAN 140/90: ICD-10-CM

## 2018-12-10 PROCEDURE — 82274 ASSAY TEST FOR BLOOD FECAL: CPT

## 2019-10-04 DIAGNOSIS — I10 ESSENTIAL HYPERTENSION WITH GOAL BLOOD PRESSURE LESS THAN 140/90: ICD-10-CM

## 2019-10-06 RX ORDER — VALSARTAN AND HYDROCHLOROTHIAZIDE 320; 25 MG/1; MG/1
TABLET, FILM COATED ORAL
Qty: 90 TABLET | Refills: 1 | Status: SHIPPED | OUTPATIENT
Start: 2019-10-06 | End: 2020-04-02

## 2019-10-06 RX ORDER — AMLODIPINE BESYLATE 10 MG/1
TABLET ORAL
Qty: 90 TABLET | Refills: 1 | Status: SHIPPED | OUTPATIENT
Start: 2019-10-06 | End: 2020-04-02

## 2019-10-06 NOTE — TELEPHONE ENCOUNTER
Please review; protocol failed.     Requested Prescriptions   Pending Prescriptions Disp Refills   • VALSARTAN-HYDROCHLOROTHIAZIDE 320-25 MG Oral Tab [Pharmacy Med Name: VALSARTAN/HYDROCHLOROTHIAZIDE 320-25 MG Tablet] 90 tablet 3     Sig: TAKE 1 TABLET EVER

## 2019-11-11 ENCOUNTER — LAB ENCOUNTER (OUTPATIENT)
Dept: LAB | Facility: HOSPITAL | Age: 69
End: 2019-11-11
Attending: INTERNAL MEDICINE
Payer: MEDICARE

## 2019-11-11 ENCOUNTER — OFFICE VISIT (OUTPATIENT)
Dept: INTERNAL MEDICINE CLINIC | Facility: CLINIC | Age: 69
End: 2019-11-11
Payer: MEDICARE

## 2019-11-11 VITALS
HEART RATE: 70 BPM | HEIGHT: 64 IN | WEIGHT: 289 LBS | BODY MASS INDEX: 49.34 KG/M2 | TEMPERATURE: 99 F | SYSTOLIC BLOOD PRESSURE: 138 MMHG | DIASTOLIC BLOOD PRESSURE: 84 MMHG | RESPIRATION RATE: 20 BRPM

## 2019-11-11 DIAGNOSIS — Z12.31 ENCOUNTER FOR SCREENING MAMMOGRAM FOR BREAST CANCER: ICD-10-CM

## 2019-11-11 DIAGNOSIS — Z78.0 POST-MENOPAUSAL: ICD-10-CM

## 2019-11-11 DIAGNOSIS — E66.9 OBESITY, UNSPECIFIED OBESITY SEVERITY, UNSPECIFIED OBESITY TYPE: ICD-10-CM

## 2019-11-11 DIAGNOSIS — I10 ESSENTIAL HYPERTENSION WITH GOAL BLOOD PRESSURE LESS THAN 140/90: ICD-10-CM

## 2019-11-11 DIAGNOSIS — G47.30 SLEEP APNEA, UNSPECIFIED TYPE: ICD-10-CM

## 2019-11-11 DIAGNOSIS — Z00.00 ENCOUNTER FOR ANNUAL HEALTH EXAMINATION: Primary | ICD-10-CM

## 2019-11-11 DIAGNOSIS — M25.512 LEFT SHOULDER PAIN, UNSPECIFIED CHRONICITY: ICD-10-CM

## 2019-11-11 DIAGNOSIS — Z12.11 COLON CANCER SCREENING: ICD-10-CM

## 2019-11-11 PROCEDURE — 85025 COMPLETE CBC W/AUTO DIFF WBC: CPT

## 2019-11-11 PROCEDURE — 84443 ASSAY THYROID STIM HORMONE: CPT

## 2019-11-11 PROCEDURE — G0439 PPPS, SUBSEQ VISIT: HCPCS | Performed by: INTERNAL MEDICINE

## 2019-11-11 PROCEDURE — 36415 COLL VENOUS BLD VENIPUNCTURE: CPT

## 2019-11-11 PROCEDURE — 80053 COMPREHEN METABOLIC PANEL: CPT

## 2019-11-11 PROCEDURE — 80061 LIPID PANEL: CPT

## 2019-11-11 NOTE — PROGRESS NOTES
HPI:   Layton Smoker is a 68year old female who presents for a Medicare Subsequent Annual Wellness visit (Pt already had Initial Annual Wellness). Patient is here for Medicare annual wellness visit. Last seen here 1 year ago.   Complains of recurr status: Former Smoker        Packs/day: 2.00        Years: 22.00        Pack years: 40        Quit date: 1985        Years since quittin.2      Smokeless tobacco: Never Used         CAGE Alcohol screening   Manuel Brooke was screened for [employer] ibuprofen (MOTRIN) 200 MG Oral Tab, Take  by mouth.        MEDICAL INFORMATION:   She  has a past medical history of H/O: hysterectomy (1991), High blood pressure, Osteoarthritis, Other ill-defined conditions(799.89) (2010), Sleep apnea, and Visual impair is 49.61 kg/m² as calculated from the following:    Height as of this encounter: 5' 4\" (1.626 m). Weight as of this encounter: 289 lb (131.1 kg).     Medicare Hearing Assessment  (Required for AWV/SWV)    Hearing Screening    Screening Method:  Question Oropharynx is clear and moist.   Eyes: Pupils are equal, round, and reactive to light. Conjunctivae and EOM are normal.   Neck: No thyromegaly present. Cardiovascular: Normal rate, regular rhythm, normal heart sounds and intact distal pulses.      Edema n Advanced directives in place. 2. Essential hypertension with goal blood pressure less than 140/90  Controlled when checked by physician. Continue current medications. Check blood work. - CBC WITH DIFFERENTIAL WITH PLATELET;  Future  - COMP METABOLIC P This section provided for quick review of chart, separate sheet to patient  [address] Internal Lab or Procedure External Lab or Procedure   Diabetes Screening      HbgA1C   Annually No results found for: A1C No fl once after your 65th birthday    Pneumococcal 23 (Pneumovax)  Covered Once after 65 09/25/2017 Please get once after your 65th birthday    Hepatitis B for Moderate/High Risk No vaccine history found Medium/high risk factors:   End-stage renal disease   Hem

## 2019-11-11 NOTE — PATIENT INSTRUCTIONS
Rohini Wolff's SCREENING SCHEDULE   Tests on this list are recommended by your physician but may not be covered, or covered at this frequency, by your insurer. Please check with your insurance carrier before scheduling to verify coverage.    YARI Colorectal Cancer Screening  Covered up to Age 76     Colonoscopy Screen   Covered every 10 years- more often if abnormal Colonoscopy due on 09/15/1998 Update Health Maintenance if applicable    Flex Sigmoidoscopy Screen  Covered every 5 years No results any previous visit. Please get once after your 65th birthday    Pneumococcal 23 (Pneumovax)  Covered Once after 65 No orders found for this or any previous visit.  Please get once after your 65th birthday    Hepatitis B for Moderate/High Risk       No order

## 2019-12-05 ENCOUNTER — HOSPITAL ENCOUNTER (OUTPATIENT)
Dept: BONE DENSITY | Age: 69
Discharge: HOME | End: 2019-12-05
Attending: INTERNAL MEDICINE
Payer: MEDICARE

## 2019-12-05 ENCOUNTER — HOSPITAL ENCOUNTER (OUTPATIENT)
Dept: MAMMOGRAPHY | Age: 69
Discharge: HOME | End: 2019-12-05
Attending: INTERNAL MEDICINE
Payer: MEDICARE

## 2019-12-05 ENCOUNTER — APPOINTMENT (OUTPATIENT)
Dept: LAB | Age: 69
End: 2019-12-05
Attending: INTERNAL MEDICINE
Payer: MEDICARE

## 2019-12-05 DIAGNOSIS — Z12.31 ENCOUNTER FOR SCREENING MAMMOGRAM FOR BREAST CANCER: ICD-10-CM

## 2019-12-05 DIAGNOSIS — Z12.11 COLON CANCER SCREENING: ICD-10-CM

## 2019-12-05 DIAGNOSIS — Z78.0 POST-MENOPAUSAL: ICD-10-CM

## 2019-12-05 PROCEDURE — 77080 DXA BONE DENSITY AXIAL: CPT | Performed by: INTERNAL MEDICINE

## 2019-12-05 PROCEDURE — 77063 BREAST TOMOSYNTHESIS BI: CPT | Performed by: INTERNAL MEDICINE

## 2019-12-05 PROCEDURE — 82274 ASSAY TEST FOR BLOOD FECAL: CPT

## 2019-12-05 PROCEDURE — 77067 SCR MAMMO BI INCL CAD: CPT | Performed by: INTERNAL MEDICINE

## 2020-04-02 DIAGNOSIS — I10 ESSENTIAL HYPERTENSION WITH GOAL BLOOD PRESSURE LESS THAN 140/90: ICD-10-CM

## 2020-04-02 RX ORDER — VALSARTAN AND HYDROCHLOROTHIAZIDE 320; 25 MG/1; MG/1
TABLET, FILM COATED ORAL
Qty: 90 TABLET | Refills: 1 | Status: SHIPPED | OUTPATIENT
Start: 2020-04-02 | End: 2020-10-19

## 2020-04-02 RX ORDER — AMLODIPINE BESYLATE 10 MG/1
TABLET ORAL
Qty: 90 TABLET | Refills: 1 | Status: SHIPPED | OUTPATIENT
Start: 2020-04-02 | End: 2020-10-19

## 2020-06-05 ENCOUNTER — NURSE TRIAGE (OUTPATIENT)
Dept: INTERNAL MEDICINE CLINIC | Facility: CLINIC | Age: 70
End: 2020-06-05

## 2020-06-05 NOTE — TELEPHONE ENCOUNTER
See Dr Jose David Davenport message below. Scheduled appt with ONOFRE PAIGE 6/8/20 @ 9:00am for evaluation of swollen feet. Pt advised if swelling worsens with sob, inability to walk, leg pain, then have someone drive her to IC or ED for further MD evaluation.   In [place]

## 2020-06-05 NOTE — TELEPHONE ENCOUNTER
Noted.    Future Appointments   Date Time Provider Elana Rowland   6/8/2020  9:00 [facility]

## 2020-06-05 NOTE — TELEPHONE ENCOUNTER
Action Requested: Summary for Provider     []  Critical Lab, Recommendations Needed  [x] Need Additional Advice  []   FYI    []   Need Orders  [] Need Medications Sent to Pharmacy  []  Other     SUMMARY:     Spoke with pt,  verified, pt stated last 2 we

## 2020-06-08 ENCOUNTER — OFFICE VISIT (OUTPATIENT)
Dept: INTERNAL MEDICINE CLINIC | Facility: CLINIC | Age: 70
End: 2020-06-08
Payer: MEDICARE

## 2020-06-08 ENCOUNTER — APPOINTMENT (OUTPATIENT)
Dept: LAB | Facility: HOSPITAL | Age: 70
End: 2020-06-08
Attending: NURSE PRACTITIONER
Payer: MEDICARE

## 2020-06-08 VITALS
WEIGHT: 290 LBS | SYSTOLIC BLOOD PRESSURE: 137 MMHG | HEIGHT: 64 IN | DIASTOLIC BLOOD PRESSURE: 84 MMHG | RESPIRATION RATE: 18 BRPM | HEART RATE: 84 BPM | BODY MASS INDEX: 49.51 KG/M2

## 2020-06-08 DIAGNOSIS — R60.1 GENERALIZED EDEMA: ICD-10-CM

## 2020-06-08 DIAGNOSIS — I10 ESSENTIAL HYPERTENSION: ICD-10-CM

## 2020-06-08 DIAGNOSIS — R60.1 GENERALIZED EDEMA: Primary | ICD-10-CM

## 2020-06-08 DIAGNOSIS — R60.0 BILATERAL LEG EDEMA: ICD-10-CM

## 2020-06-08 PROCEDURE — 80053 COMPREHEN METABOLIC PANEL: CPT

## 2020-06-08 PROCEDURE — 36415 COLL VENOUS BLD VENIPUNCTURE: CPT

## 2020-06-08 PROCEDURE — 99213 OFFICE O/P EST LOW 20 MIN: CPT | Performed by: NURSE PRACTITIONER

## 2020-06-08 PROCEDURE — 83880 ASSAY OF NATRIURETIC PEPTIDE: CPT | Performed by: NURSE PRACTITIONER

## 2020-06-08 RX ORDER — FUROSEMIDE 20 MG/1
20 TABLET ORAL DAILY
Qty: 3 TABLET | Refills: 0 | Status: SHIPPED | OUTPATIENT
Start: 2020-06-08 | End: 2020-11-13

## 2020-06-08 NOTE — PROGRESS NOTES
HPI:    Patient ID: Jacinto Randolph is a 68year old female. HPI  68year old female with swelling in legs. It seems to be increased. 78-year-old female I am meeting for the first time she complains of increased swelling in her legs bilaterally.   Karmen Lotion Apply 1 Application topically daily. 1 Bottle 3   • MetroNIDAZOLE 0.75 % External Gel Apply daily 45 g 3   • aspirin 81 MG Oral Tab Take 1 tablet by mouth daily. 1 tablet 0   • Multiple Vitamin (MULTIVITAMINS) Oral Cap Take 1 capsule by mouth daily. lb (131.1 kg)    Body mass index is 49.78 kg/m². (2)           ASSESSMENT/PLAN:     Problem List Items Addressed This Visit        Cardiovascular    Essential hypertension     A/P-Patient with chronic hypertension currently controlled on valsartan hydrochlo BNP (B TYPE NATRIUERTIC PEPTIDE)             No follow-ups on file.     Orders Placed This Encounter      CMP      BNP (BRAIN NATRIURETIC PEPTIDE) [25393] [Q]      Meds This Visit:  Requested Prescriptions     Signed Prescriptions Disp Refills   • furose

## 2020-06-08 NOTE — ASSESSMENT & PLAN NOTE
A/P-Patient with chronic hypertension currently controlled on valsartan hydrochlorothiazide and amlodipine. Blood pressure 137/84.     Plan  1) Plant protein diet given to patient  2) Reduction in weight  3) Follow low sodium diet  4) continue current bloo

## 2020-06-08 NOTE — ASSESSMENT & PLAN NOTE
A/P-68year-old female who I am meeting for the first time is a BMI of 49.78 and her blood pressure is 137/84. She complains of bilateral leg edema right greater than left.   Patient states she would get edema from time to time but it appears to be [place]

## 2020-06-09 ENCOUNTER — TELEPHONE (OUTPATIENT)
Dept: INTERNAL MEDICINE CLINIC | Facility: CLINIC | Age: 70
End: 2020-06-09

## 2020-06-09 NOTE — TELEPHONE ENCOUNTER
Referral # [id number] [facility], could Dr. Lucie Craig be added to this referral.

## 2020-06-09 NOTE — TELEPHONE ENCOUNTER
Patient is requesting a referral to see Dr. Anali Serrato, an Orthopedic Doctor, with [facility]. Patient also states a prior auth for the referral is required with her insurance.

## 2020-06-11 ENCOUNTER — TELEPHONE (OUTPATIENT)
Dept: INTERNAL MEDICINE CLINIC | Facility: CLINIC | Age: 70
End: 2020-06-11

## 2020-06-11 NOTE — TELEPHONE ENCOUNTER
----- Message from [facility].  New sent at 6/11/2020  9:14 AM CDT -----  Regarding: Referral Request  Contact: [phone]  I saw Marcella Ventura on the 8th requested a referral for an orthopedist. I called my insurance company [employer], they had no

## 2020-06-11 NOTE — TELEPHONE ENCOUNTER
Referral # [id number] status is currently OPEN. Managed care, patient is requesting referral for Dr. Apollo Elmore.

## 2020-06-12 NOTE — TELEPHONE ENCOUNTER
Hello,    Referrals has been approved by [facility]. Also, please note that referral processing time is 5-7 business days for routine request and 48 hours for urgent request.    Thank you, Arelis Montenegro Specialist    Managed Care.

## 2020-07-23 ENCOUNTER — TELEPHONE (OUTPATIENT)
Dept: CASE MANAGEMENT | Age: 70
End: 2020-07-23

## 2020-07-23 NOTE — TELEPHONE ENCOUNTER
Patient is eligible for a 2020 Medicare Advantage Supervisit. Left message to call back [phone].

## 2020-10-19 DIAGNOSIS — I10 ESSENTIAL HYPERTENSION WITH GOAL BLOOD PRESSURE LESS THAN 140/90: ICD-10-CM

## 2020-10-19 RX ORDER — AMLODIPINE BESYLATE 10 MG/1
TABLET ORAL
Qty: 90 TABLET | Refills: 1 | Status: SHIPPED | OUTPATIENT
Start: 2020-10-19 | End: 2020-11-13

## 2020-10-19 RX ORDER — VALSARTAN AND HYDROCHLOROTHIAZIDE 320; 25 MG/1; MG/1
TABLET, FILM COATED ORAL
Qty: 90 TABLET | Refills: 1 | Status: SHIPPED | OUTPATIENT
Start: 2020-10-19 | End: 2020-11-13

## 2020-11-13 ENCOUNTER — OFFICE VISIT (OUTPATIENT)
Dept: INTERNAL MEDICINE CLINIC | Facility: CLINIC | Age: 70
End: 2020-11-13
Payer: MEDICARE

## 2020-11-13 ENCOUNTER — LAB ENCOUNTER (OUTPATIENT)
Dept: LAB | Facility: HOSPITAL | Age: 70
End: 2020-11-13
Attending: INTERNAL MEDICINE
Payer: MEDICARE

## 2020-11-13 VITALS
HEART RATE: 70 BPM | RESPIRATION RATE: 16 BRPM | SYSTOLIC BLOOD PRESSURE: 120 MMHG | BODY MASS INDEX: 45.1 KG/M2 | DIASTOLIC BLOOD PRESSURE: 72 MMHG | WEIGHT: 264.19 LBS | HEIGHT: 64 IN

## 2020-11-13 DIAGNOSIS — E66.9 OBESITY, UNSPECIFIED OBESITY SEVERITY, UNSPECIFIED OBESITY TYPE: ICD-10-CM

## 2020-11-13 DIAGNOSIS — Z12.11 COLON CANCER SCREENING: ICD-10-CM

## 2020-11-13 DIAGNOSIS — I10 ESSENTIAL HYPERTENSION WITH GOAL BLOOD PRESSURE LESS THAN 140/90: ICD-10-CM

## 2020-11-13 DIAGNOSIS — Z00.00 ENCOUNTER FOR ANNUAL HEALTH EXAMINATION: Primary | ICD-10-CM

## 2020-11-13 DIAGNOSIS — Z12.31 ENCOUNTER FOR SCREENING MAMMOGRAM FOR BREAST CANCER: ICD-10-CM

## 2020-11-13 DIAGNOSIS — G47.30 SLEEP APNEA, UNSPECIFIED TYPE: ICD-10-CM

## 2020-11-13 PROCEDURE — 3008F BODY MASS INDEX DOCD: CPT | Performed by: INTERNAL MEDICINE

## 2020-11-13 PROCEDURE — 96160 PT-FOCUSED HLTH RISK ASSMT: CPT | Performed by: INTERNAL MEDICINE

## 2020-11-13 PROCEDURE — 84443 ASSAY THYROID STIM HORMONE: CPT

## 2020-11-13 PROCEDURE — 85025 COMPLETE CBC W/AUTO DIFF WBC: CPT

## 2020-11-13 PROCEDURE — 36415 COLL VENOUS BLD VENIPUNCTURE: CPT

## 2020-11-13 PROCEDURE — 99397 PER PM REEVAL EST PAT 65+ YR: CPT | Performed by: INTERNAL MEDICINE

## 2020-11-13 PROCEDURE — 82607 VITAMIN B-12: CPT

## 2020-11-13 PROCEDURE — G0439 PPPS, SUBSEQ VISIT: HCPCS | Performed by: INTERNAL MEDICINE

## 2020-11-13 PROCEDURE — 3078F DIAST BP <80 MM HG: CPT | Performed by: INTERNAL MEDICINE

## 2020-11-13 PROCEDURE — 80053 COMPREHEN METABOLIC PANEL: CPT

## 2020-11-13 PROCEDURE — 80061 LIPID PANEL: CPT

## 2020-11-13 PROCEDURE — 3074F SYST BP LT 130 MM HG: CPT | Performed by: INTERNAL MEDICINE

## 2020-11-13 RX ORDER — VALSARTAN AND HYDROCHLOROTHIAZIDE 320; 25 MG/1; MG/1
1 TABLET, FILM COATED ORAL DAILY
Qty: 90 TABLET | Refills: 3 | Status: SHIPPED | OUTPATIENT
Start: 2020-11-13 | End: 2021-10-04

## 2020-11-13 RX ORDER — AMLODIPINE BESYLATE 10 MG/1
10 TABLET ORAL DAILY
Qty: 90 TABLET | Refills: 3 | Status: SHIPPED | OUTPATIENT
Start: 2020-11-13 | End: 2021-10-04

## 2020-11-13 NOTE — PROGRESS NOTES
HPI:   Aisha Murillo is a 65year old female who presents for a MA (Medicare Advantage) Supervisit (Once per calendar year). Patient here requesting Medicare annual health assessment and follow-up on chronic medical problems as listed below.   VIKASH Montague depressed, or hopeless (over the last two weeks)?: Not at all  PHQ-2 SCORE: 0  Little interest or pleasure in doing things: Not at all  Feeling down, depressed, or hopeless: Not at all  PHQ-2 SCORE: 0      Advanced Directive:  She has a Living Will on file conjugated; and penicillin g.     CURRENT MEDICATIONS:     •  VALSARTAN-HYDROCHLOROTHIAZIDE 320-25 MG Oral Tab, TAKE 1 TABLET EVERY DAY    •  AMLODIPINE BESYLATE 10 MG Oral Tab, TAKE 1 TABLET EVERY DAY    •  Sulfacetamide Sodium, Acne, (KLARON) 10 % Externa Musculoskeletal: Positive for joint pain. Skin: Negative for rash. Neurological: Negative for weakness, numbness and headaches. Hematological: Does not bruise/bleed easily. Psychiatric/Behavioral: Negative for depressed mood.  The patient is not n Visual Acuity: Corrected Left Eye Chart Acuity: 20/20   Both Eyes Visual Acuity: Corrected Both Eyes Chart Acuity: 20/20   Able To Tolerate Visual Acuity: Yes      Physical Exam    Constitutional: She is obese.  She appears well-developed and well-nourished below.  Health maintenance issues reviewed. She has advanced directives in place. Encouraged to continue working on diet, exercise, weight loss. Follow-up in 6 months.     2. Essential hypertension with goal blood pressure less than 140/90  Well-controll patient  PREVENTATIVE SERVICES  INDICATIONS AND SCHEDULE Internal Lab or Procedure External Lab or Procedure   Diabetes Screening      HbgA1C   Annually No results found for: A1C No flowsheet data found.     Fasting Blood Sugar (FSB)Annually Glucose (mg/dL) 63th birthday    Hepatitis B for Moderate/High Risk No vaccine history found Medium/high risk factors:   End-stage renal disease   Hemophiliacs who received Factor VIII or IX concentrates   Clients of institutions for the mentally retarded   Persons who li

## 2020-11-13 NOTE — PATIENT INSTRUCTIONS
Rohini Wolff's SCREENING SCHEDULE   Tests on this list are recommended by your physician but may not be covered, or covered at this frequency, by your insurer. Please check with your insurance carrier before scheduling to verify coverage.    YARI if abnormal Colonoscopy due on 09/15/1998 Update [facility] if applicable    Flex Sigmoidoscopy Screen  Covered every 5 years No results found for this or any previous visit. No flowsheet data found.      Fecal Occult Blood   Covered Annually Occult (Pneumovax)  Covered Once after 65 No orders found for this or any previous visit. Please get once after your 65th birthday    Hepatitis B for Moderate/High Risk       No orders found for this or any previous visit.  Medium/high risk factors:   End-stage re

## 2020-12-02 ENCOUNTER — LAB ENCOUNTER (OUTPATIENT)
Dept: LAB | Age: 70
End: 2020-12-02
Attending: INTERNAL MEDICINE
Payer: MEDICARE

## 2020-12-02 DIAGNOSIS — Z12.11 COLON CANCER SCREENING: ICD-10-CM

## 2020-12-02 PROCEDURE — 82274 ASSAY TEST FOR BLOOD FECAL: CPT

## 2020-12-07 ENCOUNTER — HOSPITAL ENCOUNTER (OUTPATIENT)
Dept: MAMMOGRAPHY | Age: 70
Discharge: HOME | End: 2020-12-07
Attending: INTERNAL MEDICINE
Payer: MEDICARE

## 2020-12-07 DIAGNOSIS — Z12.31 ENCOUNTER FOR SCREENING MAMMOGRAM FOR BREAST CANCER: ICD-10-CM

## 2020-12-07 PROCEDURE — 77063 BREAST TOMOSYNTHESIS BI: CPT | Performed by: INTERNAL MEDICINE

## 2020-12-07 PROCEDURE — 77067 SCR MAMMO BI INCL CAD: CPT | Performed by: INTERNAL MEDICINE

## 2021-03-08 DIAGNOSIS — Z23 NEED FOR VACCINATION: ICD-10-CM

## 2021-04-01 ENCOUNTER — TELEPHONE (OUTPATIENT)
Dept: INTERNAL MEDICINE CLINIC | Facility: CLINIC | Age: 71
End: 2021-04-01

## 2021-04-01 NOTE — TELEPHONE ENCOUNTER
Pt is due for Medicare annual health assessment with Kaylen Dsouza, [address] [place] [address] to schedule

## 2021-06-02 ENCOUNTER — OFFICE VISIT (OUTPATIENT)
Dept: INTERNAL MEDICINE CLINIC | Facility: CLINIC | Age: 71
End: 2021-06-02
Payer: MEDICARE

## 2021-06-02 VITALS
RESPIRATION RATE: 20 BRPM | HEART RATE: 58 BPM | WEIGHT: 228 LBS | DIASTOLIC BLOOD PRESSURE: 80 MMHG | BODY MASS INDEX: 38.93 KG/M2 | HEIGHT: 64 IN | SYSTOLIC BLOOD PRESSURE: 122 MMHG

## 2021-06-02 DIAGNOSIS — M17.11 OSTEOARTHRITIS OF RIGHT KNEE, UNSPECIFIED OSTEOARTHRITIS TYPE: Primary | ICD-10-CM

## 2021-06-02 DIAGNOSIS — I10 ESSENTIAL HYPERTENSION WITH GOAL BLOOD PRESSURE LESS THAN 140/90: ICD-10-CM

## 2021-06-02 DIAGNOSIS — G47.30 SLEEP APNEA, UNSPECIFIED TYPE: ICD-10-CM

## 2021-06-02 DIAGNOSIS — E66.9 OBESITY, UNSPECIFIED OBESITY SEVERITY, UNSPECIFIED OBESITY TYPE: ICD-10-CM

## 2021-06-02 PROCEDURE — 3074F SYST BP LT 130 MM HG: CPT | Performed by: INTERNAL MEDICINE

## 2021-06-02 PROCEDURE — 3079F DIAST BP 80-89 MM HG: CPT | Performed by: INTERNAL MEDICINE

## 2021-06-02 PROCEDURE — 99213 OFFICE O/P EST LOW 20 MIN: CPT | Performed by: INTERNAL MEDICINE

## 2021-06-02 PROCEDURE — 3008F BODY MASS INDEX DOCD: CPT | Performed by: INTERNAL MEDICINE

## 2021-06-02 RX ORDER — ACETAMINOPHEN 500 MG
1000 TABLET ORAL ONCE
Status: ON HOLD | COMMUNITY
End: 2021-08-02

## 2021-06-11 NOTE — PROGRESS NOTES
HPI:    Patient ID: Adrien Romberg is a 65year old female. HPI  Patient is here for follow-up on chronic medical issues as listed below. Last seen in the office in November for [facility] visit.   At that time her weight had gone from 290 down to 264 pounds Cancer Brother         LEUKEMIA   • Lipids Brother    • Diabetes Paternal Grandmother    • Diabetes Other       Social History    Tobacco Use      Smoking status: Former Smoker        Packs/day: 2.00        Years: 22.00        Pack years: [facility] sounds: Normal heart sounds. No murmur heard. No friction rub. No gallop. Pulmonary:      Effort: Pulmonary effort is normal.      Breath sounds: Normal breath sounds. No wheezing or rales.    Abdominal:      General: Bowel sounds are normal.      Palp Referrals:  Debby Boo MD

## 2021-06-22 ENCOUNTER — LAB ENCOUNTER (OUTPATIENT)
Dept: LAB | Facility: HOSPITAL | Age: 71
End: 2021-06-22
Attending: NURSE PRACTITIONER
Payer: MEDICARE

## 2021-06-22 ENCOUNTER — OFFICE VISIT (OUTPATIENT)
Dept: INTERNAL MEDICINE CLINIC | Facility: CLINIC | Age: 71
End: 2021-06-22
Payer: MEDICARE

## 2021-06-22 ENCOUNTER — HOSPITAL ENCOUNTER (OUTPATIENT)
Dept: GENERAL RADIOLOGY | Facility: HOSPITAL | Age: 71
Discharge: HOME | End: 2021-06-22
Attending: NURSE PRACTITIONER
Payer: MEDICARE

## 2021-06-22 VITALS
SYSTOLIC BLOOD PRESSURE: 107 MMHG | WEIGHT: 224.13 LBS | HEIGHT: 64 IN | DIASTOLIC BLOOD PRESSURE: 70 MMHG | HEART RATE: 71 BPM | BODY MASS INDEX: 38.26 KG/M2

## 2021-06-22 DIAGNOSIS — Z01.818 PRE-OP EXAM: Primary | ICD-10-CM

## 2021-06-22 DIAGNOSIS — Z01.818 PRE-OP EXAM: ICD-10-CM

## 2021-06-22 DIAGNOSIS — Z01.818 PREOP EXAMINATION: Primary | ICD-10-CM

## 2021-06-22 DIAGNOSIS — M17.0 PRIMARY OSTEOARTHRITIS OF BOTH KNEES: ICD-10-CM

## 2021-06-22 PROBLEM — E66.01 SEVERE OBESITY (BMI 35.0-39.9) WITH COMORBIDITY (HCC): Chronic | Status: ACTIVE | Noted: 2021-06-22

## 2021-06-22 PROCEDURE — 3078F DIAST BP <80 MM HG: CPT | Performed by: NURSE PRACTITIONER

## 2021-06-22 PROCEDURE — 71046 X-RAY EXAM CHEST 2 VIEWS: CPT | Performed by: NURSE PRACTITIONER

## 2021-06-22 PROCEDURE — 3008F BODY MASS INDEX DOCD: CPT | Performed by: NURSE PRACTITIONER

## 2021-06-22 PROCEDURE — 93010 ELECTROCARDIOGRAM REPORT: CPT | Performed by: NURSE PRACTITIONER

## 2021-06-22 PROCEDURE — 3074F SYST BP LT 130 MM HG: CPT | Performed by: NURSE PRACTITIONER

## 2021-06-22 PROCEDURE — 93005 ELECTROCARDIOGRAM TRACING: CPT

## 2021-06-22 PROCEDURE — 99214 OFFICE O/P EST MOD 30 MIN: CPT | Performed by: NURSE PRACTITIONER

## 2021-06-22 NOTE — ASSESSMENT & PLAN NOTE
Patient used the CPAP for years and then alarms were going off and disrupting her sleep. She has not used the CPAP for many years and does not have the equipment at home. Her surgeon would like her to get a sleep study before surgery.     Plan  Home sleep s

## 2021-06-22 NOTE — ASSESSMENT & PLAN NOTE
A/P patient with a history of hypertension she is on amlodipine 10 mg daily valsartan hydrochlorothiazide 3 20–25 daily. Vitals are stable at visit and blood pressure is good.     Blood pressure 107/70, pulse 71, height 5' 4\" (1.626 m), weight 224 lb 1.6

## 2021-06-22 NOTE — PROGRESS NOTES
HPI:    Patient ID: Aisha Murillo is a 65year old female. 7/14/2021- PATIENT CALLED AND HAS NOT BEEN CLEARED FOR SURGERY BY CARDIOLOGY AND PULMONOLOGIST>  HPI Knee pain/ Pre-op physical exam for right knee replacement.   Right knee replacement on July ill-defined conditions(139.33) 2010    ADENOSINE-THALLIUM: QUESTION OF FIXED DEFECT AT [facility]. PER.  NG   • Sleep apnea     has CPAP but does not use it due to claustrophobia   • Visual impairment       Past Surgical History:   Procedure Laterality Date   • A Psychiatric/Behavioral: Negative for dysphoric mood and sleep disturbance. The patient is not nervous/anxious. Current Outpatient Medications   Medication Sig Dispense Refill   • acetaminophen 500 MG Oral Tab Take 1,000 mg by mouth daily. sounds. No wheezing, rhonchi or rales. Abdominal:      General: Bowel sounds are normal. There is no distension. Palpations: Abdomen is soft. There is no mass. Tenderness: There is no abdominal tenderness.  There is no right CVA tenderness, left STUDY TRANSCRIPTION             No follow-ups on file.     Orders Placed This Encounter      CBC W Differential W Platelet [E]      Comp Metabolic Panel (14)      Prothrombin Time (PT) [E]      PTT, Activated      Meds This Visit:  Requested Prescriptions

## 2021-06-22 NOTE — ASSESSMENT & PLAN NOTE
A/P 57-year-old female walking with a cane and is here for preop testing and physical exam.  She will be having right knee replacement on June 21, 2021.     Plan  1) Continue to use cane for ambulation  2) Tylenol 1000mg daily for pain

## 2021-06-23 ENCOUNTER — TELEPHONE (OUTPATIENT)
Dept: PULMONOLOGY | Facility: CLINIC | Age: 71
End: 2021-06-23

## 2021-06-23 DIAGNOSIS — Z01.818 PRE-OP EVALUATION: Primary | ICD-10-CM

## 2021-06-23 NOTE — TELEPHONE ENCOUNTER
----- Message from Azalea Lynn MD sent at 6/22/2021  5:29 PM CDT -----  Regarding: FW: pulm consult  RN, please reach out to this patient to get her scheduled promptly for a preoperative sleep apnea evaluation as she is going to undergo orthopedic dorcas and/or new sleep study is indicated. I can coordinate with Pulmonary per the protocol, unless you object. Dr. Farooq Hines and I just wanted you to be aware of the plan. I will reach out to this patient early next week.     Please let me know if there are

## 2021-06-24 ENCOUNTER — LAB ENCOUNTER (OUTPATIENT)
Dept: LAB | Facility: HOSPITAL | Age: 71
End: 2021-06-24
Attending: NURSE PRACTITIONER
Payer: MEDICARE

## 2021-06-24 DIAGNOSIS — Z01.818 PRE-OP EXAM: ICD-10-CM

## 2021-06-24 PROCEDURE — 36415 COLL VENOUS BLD VENIPUNCTURE: CPT

## 2021-06-24 PROCEDURE — 80053 COMPREHEN METABOLIC PANEL: CPT

## 2021-06-24 PROCEDURE — 85730 THROMBOPLASTIN TIME PARTIAL: CPT

## 2021-06-24 PROCEDURE — 85610 PROTHROMBIN TIME: CPT

## 2021-06-24 PROCEDURE — 85025 COMPLETE CBC W/AUTO DIFF WBC: CPT

## 2021-06-24 NOTE — TELEPHONE ENCOUNTER
Per pt she is having total R knee replacement tentatively on 7/31 w/ Dr. Shaw Andre. She accepted appt w/ Dr. João Goodman on 7/12 12 pm [facility]. Appt info given. Pt voiced understanding.

## 2021-06-28 ENCOUNTER — TELEPHONE (OUTPATIENT)
Dept: INTERNAL MEDICINE CLINIC | Facility: CLINIC | Age: 71
End: 2021-06-28

## 2021-06-28 NOTE — TELEPHONE ENCOUNTER
Rohini Wolff    Patient called she has appointments with cardiology and pulmonary for clearance for her surgery.     Tawny Klein, MARCIA  Working with Tristin Loco

## 2021-07-07 ENCOUNTER — TELEPHONE (OUTPATIENT)
Dept: INTERNAL MEDICINE CLINIC | Facility: CLINIC | Age: 71
End: 2021-07-07

## 2021-07-07 NOTE — TELEPHONE ENCOUNTER
Please contact patient and assist her in scheduling pre-op exam with Dr Louie Madsen or Alexis Lees prior to surgery 7/21/21.

## 2021-07-09 ENCOUNTER — TELEPHONE (OUTPATIENT)
Dept: INTERNAL MEDICINE CLINIC | Facility: CLINIC | Age: 71
End: 2021-07-09

## 2021-07-09 DIAGNOSIS — G47.33 OSA (OBSTRUCTIVE SLEEP APNEA): Primary | ICD-10-CM

## 2021-07-12 ENCOUNTER — OFFICE VISIT (OUTPATIENT)
Dept: PULMONOLOGY | Facility: CLINIC | Age: 71
End: 2021-07-12
Payer: MEDICARE

## 2021-07-12 VITALS
DIASTOLIC BLOOD PRESSURE: 79 MMHG | OXYGEN SATURATION: 100 % | RESPIRATION RATE: 22 BRPM | BODY MASS INDEX: 37.9 KG/M2 | SYSTOLIC BLOOD PRESSURE: 116 MMHG | HEIGHT: 64 IN | WEIGHT: 222 LBS | HEART RATE: 57 BPM

## 2021-07-12 DIAGNOSIS — G47.30 SLEEP APNEA, UNSPECIFIED TYPE: Primary | ICD-10-CM

## 2021-07-12 PROCEDURE — 3008F BODY MASS INDEX DOCD: CPT | Performed by: INTERNAL MEDICINE

## 2021-07-12 PROCEDURE — 3078F DIAST BP <80 MM HG: CPT | Performed by: INTERNAL MEDICINE

## 2021-07-12 PROCEDURE — 99203 OFFICE O/P NEW LOW 30 MIN: CPT | Performed by: INTERNAL MEDICINE

## 2021-07-12 PROCEDURE — 3074F SYST BP LT 130 MM HG: CPT | Performed by: INTERNAL MEDICINE

## 2021-07-12 NOTE — PROGRESS NOTES
Dear Dr. Serrano Curtis:           As you know, Ms. Varun Marvin is a 47-year-old female who I am now evaluating for preoperative clearance.     HISTORY OF PRESENT ILLNESS: The patient has a history of debilitating knee arthritis and is seeking pulmonary clearance f and joints unremarkable except for arthritis. No weight loss no weight gain. PHYSICAL EXAMINATION: Physical Examination:  Vital signs normal. HEENT examination is unremarkable with pupils equal round and reactive to light and accommodation.  Neck without

## 2021-07-14 ENCOUNTER — TELEPHONE (OUTPATIENT)
Dept: INTERNAL MEDICINE CLINIC | Facility: CLINIC | Age: 71
End: 2021-07-14

## 2021-07-14 DIAGNOSIS — Z01.818 PRE-OP EXAMINATION: ICD-10-CM

## 2021-07-14 DIAGNOSIS — R94.31 ABNORMAL EKG: Primary | ICD-10-CM

## 2021-07-14 NOTE — TELEPHONE ENCOUNTER
Patient called to inform me she has not been cleared for surgery by cardiology or pulmonologist.    MARCIA Rao  Working with [facility]

## 2021-07-16 ENCOUNTER — TELEPHONE (OUTPATIENT)
Dept: CARDIOLOGY CLINIC | Facility: CLINIC | Age: 71
End: 2021-07-16

## 2021-07-16 ENCOUNTER — TELEPHONE (OUTPATIENT)
Dept: INTERNAL MEDICINE CLINIC | Facility: CLINIC | Age: 71
End: 2021-07-16

## 2021-07-16 NOTE — TELEPHONE ENCOUNTER
S/w Amol Chauhan from surgeon office and relayed that patient walked out in middle of  her appointment. She did not want to do testing.

## 2021-07-16 NOTE — TELEPHONE ENCOUNTER
Spoke with José Miguel Cerda (surgery coordinator from [facility]),  verified. She stated their system is down since Tuesday. She is looking for surgical clearance, labs, EKG results from PCP, pt is having RT knee replacement on 21 with Dr Evelina Zuniga.   pls send t

## 2021-07-17 NOTE — TELEPHONE ENCOUNTER
Spoke with patient and she stated she is a high risk for surgical procedure and will not be cleared by cardiology.

## 2021-07-19 NOTE — TELEPHONE ENCOUNTER
Patient stated she is a high risk for surgical procedure and will not be cleared by her cardiologist.

## 2021-07-19 NOTE — TELEPHONE ENCOUNTER
Spoke with pt,  verified. Spoke with Matt Abarca (surgery coordinator from [facility]), TIMA verified. She stated pt is upset that she was not cleared for surgery. She is looking for surgical clearance from the Dr. Mp Sosa.   She stated pt doesn't want to do stre

## 2021-07-19 NOTE — TELEPHONE ENCOUNTER
Noted.     Future Appointments   Date Time Provider Elana Rowland   7/21/2021 11:30 AM [facility]   8/6/2021 10:30 AM Sean Moncada MD MMO NP ORTHO [facility]

## 2021-07-20 NOTE — TELEPHONE ENCOUNTER
Spoke with patient for 10-minute is on the phone. Chart reviewed. Knee replacement surgery was stopped and canceled due to possible cardiology irregularities. EKG showed old old myocardial infarction. No change since 2010.   In looking through my old no

## 2021-07-21 RX ORDER — ACETAMINOPHEN 500 MG
1000 TABLET ORAL ONCE
Status: CANCELLED | OUTPATIENT
Start: 2021-07-21 | End: 2021-07-21

## 2021-07-27 ENCOUNTER — NURSE ONLY (OUTPATIENT)
Dept: LAB | Facility: HOSPITAL | Age: 71
End: 2021-07-27
Attending: ORTHOPAEDIC SURGERY
Payer: MEDICARE

## 2021-07-27 ENCOUNTER — HOSPITAL ENCOUNTER (OUTPATIENT)
Dept: NUCLEAR MEDICINE | Facility: HOSPITAL | Age: 71
Discharge: HOME | End: 2021-07-27
Attending: INTERNAL MEDICINE
Payer: MEDICARE

## 2021-07-27 ENCOUNTER — HOSPITAL ENCOUNTER (OUTPATIENT)
Dept: CV DIAGNOSTICS | Facility: HOSPITAL | Age: 71
Discharge: HOME | End: 2021-07-27
Attending: INTERNAL MEDICINE
Payer: MEDICARE

## 2021-07-27 DIAGNOSIS — Z01.818 PRE-OP EXAMINATION: ICD-10-CM

## 2021-07-27 DIAGNOSIS — R94.31 ABNORMAL EKG: ICD-10-CM

## 2021-07-27 DIAGNOSIS — M17.11 PRIMARY OSTEOARTHRITIS OF RIGHT KNEE: ICD-10-CM

## 2021-07-27 LAB
APTT PPP: 34.9 SECONDS (ref 23.2–35.3)
INR BLD: 1.11 (ref 0.9–1.2)
PROTHROMBIN TIME: 14.1 SECONDS (ref 11.8–14.5)

## 2021-07-27 PROCEDURE — 85610 PROTHROMBIN TIME: CPT

## 2021-07-27 PROCEDURE — 78452 HT MUSCLE IMAGE SPECT MULT: CPT | Performed by: INTERNAL MEDICINE

## 2021-07-27 PROCEDURE — 86850 RBC ANTIBODY SCREEN: CPT

## 2021-07-27 PROCEDURE — 86900 BLOOD TYPING SEROLOGIC ABO: CPT

## 2021-07-27 PROCEDURE — 86901 BLOOD TYPING SEROLOGIC RH(D): CPT

## 2021-07-27 PROCEDURE — 36415 COLL VENOUS BLD VENIPUNCTURE: CPT

## 2021-07-27 PROCEDURE — 93017 CV STRESS TEST TRACING ONLY: CPT | Performed by: INTERNAL MEDICINE

## 2021-07-27 PROCEDURE — 85730 THROMBOPLASTIN TIME PARTIAL: CPT

## 2021-07-27 PROCEDURE — 93016 CV STRESS TEST SUPVJ ONLY: CPT | Performed by: INTERNAL MEDICINE

## 2021-07-27 PROCEDURE — 93018 CV STRESS TEST I&R ONLY: CPT | Performed by: INTERNAL MEDICINE

## 2021-07-27 PROCEDURE — 87081 CULTURE SCREEN ONLY: CPT

## 2021-07-28 ENCOUNTER — ANESTHESIA EVENT (OUTPATIENT)
Dept: SURGERY | Facility: HOSPITAL | Age: 71
End: 2021-07-28
Payer: MEDICARE

## 2021-07-28 LAB
ANTIBODY SCREEN: NEGATIVE
RH BLOOD TYPE: POSITIVE

## 2021-07-29 ENCOUNTER — TELEPHONE (OUTPATIENT)
Dept: INTERNAL MEDICINE CLINIC | Facility: CLINIC | Age: 71
End: 2021-07-29

## 2021-07-29 NOTE — TELEPHONE ENCOUNTER
Pt was seen by ONOFRE Ochoa for pre-op clearance on 6/22/21. She was referred to Cardiology for clearance and per notes pt walked out of her cardiology apt and didn't want additional testing for clearance.      Pt had stress test ordered by Dr Edie Crump 7/27/21 a

## 2021-07-30 NOTE — TELEPHONE ENCOUNTER
I reviewed this patient's chart, past medical history, lab tests and stress test completed recently as physician on call. I have spoken with the patient after the review.   She has been seen by pulmonology and has been given clearance with necessary periop

## 2021-07-31 NOTE — H&P
[facility]  History & Physical    Leola Castleman Issler Patient Status:  Outpatient in a Bed    9/15/1950 MRN [id number]   [facility] SURGERY Attending Zenobia Soto MD   Hosp Day # 0 PCP Ray Baez MD     Chief Complaint:  Right knee p alcohol and does not use drugs.     Allergies:    Metoprolol Succinat*    OTHER (SEE COMMENTS)    Comment:Other reaction(s): Bradycardia  Benazepril              FACE FLUSHING    Comment:Other reaction(s): Flushing  Estrogens, Conjugat*    BLEEDING    Comme

## 2021-07-31 NOTE — PROGRESS NOTES
Norah Escalante New  7/29/2021   Telephone  MRN:  [id number]  Description: 65year old female Provider: Mian Doll MD Department: [facility]-Internal Med   Telephone  Open   7/29/2021  Dwain Bey MD  Internal

## 2021-07-31 NOTE — PROGRESS NOTES
Nadira Wolff  6/17/2021 1:20 PM   Office Visit  MRN:  [id number]  Description: 65year old female Provider: Gloria Henriquez MD Department: [facility]   Scanning Cover Sheet    Click to print Barcode Encounter Cover Sheet for scanning   Office Visit Sig Dispense Refill   • acetaminophen 500 MG Oral Tab Take 1,000 mg by mouth daily.       • amLODIPine Besylate 10 MG Oral Tab Take 1 tablet (10 mg total) by mouth daily.  90 tablet 3   • Valsartan-hydroCHLOROthiazide 320-25 MG Oral Tab Take 1 tablet by jodie No         REVIEW OF SYSTEMS:      GENERAL HEALTH: feels well otherwise  SKIN: denies any unusual skin lesions or rashes  RESPIRATORY: denies shortness of breath with exertion  CARDIOVASCULAR: denies chest pain on exertion  GI: denies abdominal pain and de replacement as outpatient at [facility] surgery [place]. Medical clearance. Potential sleep study to rule out sleep apnea.     Diagnoses and all orders for this visit:     Primary osteoarthritis of right knee  -     [facility] SURG SCHED  -     MRSA / MSSA PRE-OP;

## 2021-08-02 ENCOUNTER — ANESTHESIA (OUTPATIENT)
Dept: SURGERY | Facility: HOSPITAL | Age: 71
End: 2021-08-02
Payer: MEDICARE

## 2021-08-02 ENCOUNTER — HOSPITAL ENCOUNTER (OUTPATIENT)
Facility: HOSPITAL | Age: 71
Discharge: HOME HEALTH | End: 2021-08-03
Attending: ORTHOPAEDIC SURGERY | Admitting: ORTHOPAEDIC SURGERY
Payer: MEDICARE

## 2021-08-02 ENCOUNTER — APPOINTMENT (OUTPATIENT)
Dept: GENERAL RADIOLOGY | Facility: HOSPITAL | Age: 71
End: 2021-08-02
Attending: ORTHOPAEDIC SURGERY
Payer: MEDICARE

## 2021-08-02 DIAGNOSIS — M17.11 PRIMARY OSTEOARTHRITIS OF RIGHT KNEE: Primary | ICD-10-CM

## 2021-08-02 PROCEDURE — 0SRC0J9 REPLACEMENT OF RIGHT KNEE JOINT WITH SYNTHETIC SUBSTITUTE, CEMENTED, OPEN APPROACH: ICD-10-PCS | Performed by: ORTHOPAEDIC SURGERY

## 2021-08-02 PROCEDURE — 3E0T33Z INTRODUCTION OF ANTI-INFLAMMATORY INTO PERIPHERAL NERVES AND PLEXI, PERCUTANEOUS APPROACH: ICD-10-PCS | Performed by: ANESTHESIOLOGY

## 2021-08-02 PROCEDURE — BQ47ZZZ ULTRASONOGRAPHY OF RIGHT KNEE: ICD-10-PCS | Performed by: ANESTHESIOLOGY

## 2021-08-02 PROCEDURE — 76942 ECHO GUIDE FOR BIOPSY: CPT | Performed by: ANESTHESIOLOGY

## 2021-08-02 PROCEDURE — 3E0T3BZ INTRODUCTION OF ANESTHETIC AGENT INTO PERIPHERAL NERVES AND PLEXI, PERCUTANEOUS APPROACH: ICD-10-PCS | Performed by: ANESTHESIOLOGY

## 2021-08-02 PROCEDURE — 88305 TISSUE EXAM BY PATHOLOGIST: CPT | Performed by: ORTHOPAEDIC SURGERY

## 2021-08-02 PROCEDURE — 88311 DECALCIFY TISSUE: CPT | Performed by: ORTHOPAEDIC SURGERY

## 2021-08-02 PROCEDURE — 73560 X-RAY EXAM OF KNEE 1 OR 2: CPT | Performed by: ORTHOPAEDIC SURGERY

## 2021-08-02 DEVICE — SMARTSET HIGH PERFORMANCE MV MEDIUM VISCOSITY BONE CEMENT 40G
Type: IMPLANTABLE DEVICE | Site: KNEE | Status: FUNCTIONAL
Brand: SMARTSET

## 2021-08-02 DEVICE — ATTUNE KNEE SYSTEM FEMORAL POSTERIOR STABILIZED NARROW SIZE 6N RIGHT CEMENTED
Type: IMPLANTABLE DEVICE | Site: KNEE | Status: FUNCTIONAL
Brand: ATTUNE

## 2021-08-02 DEVICE — ATTUNE PATELLA MEDIALIZED ANATOMIC 32MM CEMENTED AOX
Type: IMPLANTABLE DEVICE | Site: KNEE | Status: FUNCTIONAL
Brand: ATTUNE

## 2021-08-02 DEVICE — ATTUNE KNEE SYSTEM TIBIAL BASE ROTATING PLATFORM SIZE 5 CEMENTED
Type: IMPLANTABLE DEVICE | Site: KNEE | Status: FUNCTIONAL
Brand: ATTUNE

## 2021-08-02 DEVICE — ATTUNE KNEE SYSTEM TIBIAL INSERT ROTATING PLATFORM POSTERIOR STABILIZED 6 7MM AOX
Type: IMPLANTABLE DEVICE | Site: KNEE | Status: FUNCTIONAL
Brand: ATTUNE

## 2021-08-02 RX ORDER — SODIUM PHOSPHATE, DIBASIC AND SODIUM PHOSPHATE, MONOBASIC 7; 19 G/133ML; G/133ML
1 ENEMA RECTAL ONCE AS NEEDED
Status: DISCONTINUED | OUTPATIENT
Start: 2021-08-02 | End: 2021-08-03

## 2021-08-02 RX ORDER — POLYETHYLENE GLYCOL 3350 17 G/17G
17 POWDER, FOR SOLUTION ORAL DAILY PRN
Status: DISCONTINUED | OUTPATIENT
Start: 2021-08-02 | End: 2021-08-03

## 2021-08-02 RX ORDER — SODIUM CHLORIDE, SODIUM LACTATE, POTASSIUM CHLORIDE, CALCIUM CHLORIDE 600; 310; 30; 20 MG/100ML; MG/100ML; MG/100ML; MG/100ML
INJECTION, SOLUTION INTRAVENOUS CONTINUOUS
Status: DISCONTINUED | OUTPATIENT
Start: 2021-08-02 | End: 2021-08-03

## 2021-08-02 RX ORDER — TRANEXAMIC ACID 10 MG/ML
1000 INJECTION, SOLUTION INTRAVENOUS ONCE
Status: COMPLETED | OUTPATIENT
Start: 2021-08-02 | End: 2021-08-02

## 2021-08-02 RX ORDER — DIPHENHYDRAMINE HYDROCHLORIDE 50 MG/ML
12.5 INJECTION INTRAMUSCULAR; INTRAVENOUS EVERY 4 HOURS PRN
Status: DISCONTINUED | OUTPATIENT
Start: 2021-08-02 | End: 2021-08-03

## 2021-08-02 RX ORDER — DOCUSATE SODIUM 100 MG/1
100 CAPSULE, LIQUID FILLED ORAL 2 TIMES DAILY
Status: DISCONTINUED | OUTPATIENT
Start: 2021-08-02 | End: 2021-08-03

## 2021-08-02 RX ORDER — ONDANSETRON 2 MG/ML
INJECTION INTRAMUSCULAR; INTRAVENOUS AS NEEDED
Status: DISCONTINUED | OUTPATIENT
Start: 2021-08-02 | End: 2021-08-02 | Stop reason: SURG

## 2021-08-02 RX ORDER — BUPIVACAINE HYDROCHLORIDE 7.5 MG/ML
INJECTION, SOLUTION INTRASPINAL AS NEEDED
Status: DISCONTINUED | OUTPATIENT
Start: 2021-08-02 | End: 2021-08-02 | Stop reason: SURG

## 2021-08-02 RX ORDER — ONDANSETRON 2 MG/ML
4 INJECTION INTRAMUSCULAR; INTRAVENOUS EVERY 4 HOURS PRN
Status: DISCONTINUED | OUTPATIENT
Start: 2021-08-02 | End: 2021-08-03

## 2021-08-02 RX ORDER — DEXAMETHASONE SODIUM PHOSPHATE 4 MG/ML
VIAL (ML) INJECTION AS NEEDED
Status: DISCONTINUED | OUTPATIENT
Start: 2021-08-02 | End: 2021-08-02 | Stop reason: SURG

## 2021-08-02 RX ORDER — ONDANSETRON 2 MG/ML
4 INJECTION INTRAMUSCULAR; INTRAVENOUS AS NEEDED
Status: DISCONTINUED | OUTPATIENT
Start: 2021-08-02 | End: 2021-08-02 | Stop reason: HOSPADM

## 2021-08-02 RX ORDER — BISACODYL 10 MG
10 SUPPOSITORY, RECTAL RECTAL
Status: DISCONTINUED | OUTPATIENT
Start: 2021-08-02 | End: 2021-08-03

## 2021-08-02 RX ORDER — MIDAZOLAM HYDROCHLORIDE 1 MG/ML
1 INJECTION INTRAMUSCULAR; INTRAVENOUS EVERY 5 MIN PRN
Status: DISCONTINUED | OUTPATIENT
Start: 2021-08-02 | End: 2021-08-02 | Stop reason: HOSPADM

## 2021-08-02 RX ORDER — ACETAMINOPHEN 325 MG/1
650 TABLET ORAL ONCE
Status: COMPLETED | OUTPATIENT
Start: 2021-08-02 | End: 2021-08-02

## 2021-08-02 RX ORDER — HYDROCODONE BITARTRATE AND ACETAMINOPHEN 5; 325 MG/1; MG/1
1 TABLET ORAL AS NEEDED
Status: DISCONTINUED | OUTPATIENT
Start: 2021-08-02 | End: 2021-08-02 | Stop reason: HOSPADM

## 2021-08-02 RX ORDER — NALOXONE HYDROCHLORIDE 0.4 MG/ML
80 INJECTION, SOLUTION INTRAMUSCULAR; INTRAVENOUS; SUBCUTANEOUS AS NEEDED
Status: DISCONTINUED | OUTPATIENT
Start: 2021-08-02 | End: 2021-08-02 | Stop reason: HOSPADM

## 2021-08-02 RX ORDER — DIPHENHYDRAMINE HCL 25 MG
25 CAPSULE ORAL EVERY 4 HOURS PRN
Status: DISCONTINUED | OUTPATIENT
Start: 2021-08-02 | End: 2021-08-03

## 2021-08-02 RX ORDER — HYDROMORPHONE HYDROCHLORIDE 1 MG/ML
0.4 INJECTION, SOLUTION INTRAMUSCULAR; INTRAVENOUS; SUBCUTANEOUS EVERY 5 MIN PRN
Status: DISCONTINUED | OUTPATIENT
Start: 2021-08-02 | End: 2021-08-02 | Stop reason: HOSPADM

## 2021-08-02 RX ORDER — CEFAZOLIN SODIUM 1 G/3ML
INJECTION, POWDER, FOR SOLUTION INTRAMUSCULAR; INTRAVENOUS AS NEEDED
Status: DISCONTINUED | OUTPATIENT
Start: 2021-08-02 | End: 2021-08-02 | Stop reason: SURG

## 2021-08-02 RX ORDER — DEXAMETHASONE SODIUM PHOSPHATE 4 MG/ML
4 VIAL (ML) INJECTION AS NEEDED
Status: DISCONTINUED | OUTPATIENT
Start: 2021-08-02 | End: 2021-08-02 | Stop reason: HOSPADM

## 2021-08-02 RX ORDER — SENNOSIDES 8.6 MG
17.2 TABLET ORAL NIGHTLY
Status: DISCONTINUED | OUTPATIENT
Start: 2021-08-02 | End: 2021-08-03

## 2021-08-02 RX ORDER — MIDAZOLAM HYDROCHLORIDE 1 MG/ML
INJECTION INTRAMUSCULAR; INTRAVENOUS AS NEEDED
Status: DISCONTINUED | OUTPATIENT
Start: 2021-08-02 | End: 2021-08-02 | Stop reason: SURG

## 2021-08-02 RX ORDER — ACETAMINOPHEN 325 MG/1
TABLET ORAL
Status: COMPLETED
Start: 2021-08-02 | End: 2021-08-02

## 2021-08-02 RX ORDER — DIPHENHYDRAMINE HYDROCHLORIDE 50 MG/ML
25 INJECTION INTRAMUSCULAR; INTRAVENOUS ONCE AS NEEDED
Status: ACTIVE | OUTPATIENT
Start: 2021-08-02 | End: 2021-08-02

## 2021-08-02 RX ORDER — LIDOCAINE HYDROCHLORIDE 10 MG/ML
INJECTION, SOLUTION EPIDURAL; INFILTRATION; INTRACAUDAL; PERINEURAL AS NEEDED
Status: DISCONTINUED | OUTPATIENT
Start: 2021-08-02 | End: 2021-08-02 | Stop reason: SURG

## 2021-08-02 RX ORDER — METOCLOPRAMIDE HYDROCHLORIDE 5 MG/ML
10 INJECTION INTRAMUSCULAR; INTRAVENOUS AS NEEDED
Status: DISCONTINUED | OUTPATIENT
Start: 2021-08-02 | End: 2021-08-02 | Stop reason: HOSPADM

## 2021-08-02 RX ORDER — CEFAZOLIN SODIUM/WATER 2 G/20 ML
2 SYRINGE (ML) INTRAVENOUS EVERY 8 HOURS
Status: COMPLETED | OUTPATIENT
Start: 2021-08-02 | End: 2021-08-03

## 2021-08-02 RX ORDER — ZOLPIDEM TARTRATE 5 MG/1
5 TABLET ORAL NIGHTLY PRN
Status: DISCONTINUED | OUTPATIENT
Start: 2021-08-02 | End: 2021-08-03

## 2021-08-02 RX ORDER — SODIUM CHLORIDE, SODIUM LACTATE, POTASSIUM CHLORIDE, CALCIUM CHLORIDE 600; 310; 30; 20 MG/100ML; MG/100ML; MG/100ML; MG/100ML
INJECTION, SOLUTION INTRAVENOUS CONTINUOUS
Status: DISCONTINUED | OUTPATIENT
Start: 2021-08-02 | End: 2021-08-02 | Stop reason: HOSPADM

## 2021-08-02 RX ORDER — HYDROCODONE BITARTRATE AND ACETAMINOPHEN 5; 325 MG/1; MG/1
2 TABLET ORAL AS NEEDED
Status: DISCONTINUED | OUTPATIENT
Start: 2021-08-02 | End: 2021-08-02 | Stop reason: HOSPADM

## 2021-08-02 RX ORDER — MELATONIN
325
Status: DISCONTINUED | OUTPATIENT
Start: 2021-08-03 | End: 2021-08-03

## 2021-08-02 RX ADMIN — DEXAMETHASONE SODIUM PHOSPHATE 2 MG: 4 MG/ML VIAL (ML) INJECTION at 12:50:00

## 2021-08-02 RX ADMIN — BUPIVACAINE HYDROCHLORIDE 1.6 ML: 7.5 INJECTION, SOLUTION INTRASPINAL at 12:45:00

## 2021-08-02 RX ADMIN — LIDOCAINE HYDROCHLORIDE 25 MG: 10 INJECTION, SOLUTION EPIDURAL; INFILTRATION; INTRACAUDAL; PERINEURAL at 12:45:00

## 2021-08-02 RX ADMIN — CEFAZOLIN SODIUM 1 G: 1 INJECTION, POWDER, FOR SOLUTION INTRAMUSCULAR; INTRAVENOUS at 13:07:00

## 2021-08-02 RX ADMIN — TRANEXAMIC ACID 1000 MG: 10 INJECTION, SOLUTION INTRAVENOUS at 13:03:00

## 2021-08-02 RX ADMIN — ONDANSETRON 4 MG: 2 INJECTION INTRAMUSCULAR; INTRAVENOUS at 13:19:00

## 2021-08-02 RX ADMIN — MIDAZOLAM HYDROCHLORIDE 1 MG: 1 INJECTION INTRAMUSCULAR; INTRAVENOUS at 12:39:00

## 2021-08-02 RX ADMIN — DEXAMETHASONE SODIUM PHOSPHATE 4 MG: 4 MG/ML VIAL (ML) INJECTION at 12:57:00

## 2021-08-02 NOTE — ANESTHESIA PROCEDURE NOTES
Regional Block  Performed by: Leonid Villanueva MD  Authorized by: Leonid Villanueva MD       General Information and Staff    Start Time:  8/2/2021 12:48 PM  End Time:  8/2/2021 12:51 PM  Anesthesiologist:  Leonid Villanueva MD  Performed by:   Anesthesiologist

## 2021-08-02 NOTE — ANESTHESIA POSTPROCEDURE EVALUATION
[address] [facility] Patient Status:  Outpatient in a Bed   Age/Gender 65year old female MRN [id number]   Location [facility] Attending John Solitario MD   Hosp Day # 0 PCP Garrick Alcala MD       Anesthesia Pos

## 2021-08-02 NOTE — OPERATIVE REPORT
[address]  TOTAL KNEE OPERATIVE REPORT:  DATE OF SURGERY: 8/2/2021    Merle Wolff       [id number]     9/15/1950    PREOP DX:  RIGHT  KNEE PRIMARY OSTEOARTHRITIS  POSTOP DX: RIGHT KNEE PRIMARY OSTEOARTHRITIS  PROCEDURE: RIGHT TOTAL KNEE REPLACEME GAPS WERE FOUND TO BE EQUAL. VALGUS/VARUS STRESS TEST WAS STABLE. FEMUR WAS FINISHED OFF WITH CHAMFER AND [address] CUTS IN USUAL FASHION. POSTERIOR FEMORAL OSTEOPHYTES WERE REMOVED. POSTERIOR CAPSULAR RELEASE WAS DONE CAREFULLY.     PROXIMAL TIBIA WAS EXP

## 2021-08-02 NOTE — ANESTHESIA PREPROCEDURE EVALUATION
PRE-OP EVALUATION    Patient Name: Sharyn Webber    Admit Diagnosis: Primary osteoarthritis of right knee [M17.11]    Pre-op Diagnosis: Primary osteoarthritis of right knee [M17.11]    RIGHT TOTAL KNEE REPLACEMENT    Anesthesia Procedure: RIGHT TOTAL KN 20 tablet, Rfl: 0,  at post   traMADol 50 MG Oral Tab, Take 1 tablet (50 mg total) by mouth every 6 (six) hours as needed for Pain (moderate pain). , Disp: 40 tablet, Rfl: 0,  at post op   ergocalciferol 1.25 MG (99193 UT) Oral Cap, Take 1 capsule (50,000 U Value Date    WBC 5.7 06/24/2021    RBC 4.86 06/24/2021    HGB 14.4 06/24/2021    HCT 45.0 06/24/2021    MCV 92.6 06/24/2021    MCH 29.6 06/24/2021    MCHC 32.0 06/24/2021    RDW 13.5 06/24/2021    .0 06/24/2021     Lab Results   Component Value Timoteo

## 2021-08-02 NOTE — ANESTHESIA PROCEDURE NOTES
Spinal Block  Performed by: Timothy Parks MD  Authorized by: Timothy Parks MD       General Information and Staff    Start Time:  8/2/2021 12:40 PM  End Time:  8/2/2021 12:25 PM  Anesthesiologist:  Timothy Parks MD  Performed by:   Anesthesiologist  S

## 2021-08-03 VITALS
HEART RATE: 50 BPM | HEIGHT: 64 IN | RESPIRATION RATE: 17 BRPM | OXYGEN SATURATION: 94 % | BODY MASS INDEX: 36.54 KG/M2 | SYSTOLIC BLOOD PRESSURE: 108 MMHG | TEMPERATURE: 98 F | WEIGHT: 214 LBS | DIASTOLIC BLOOD PRESSURE: 63 MMHG

## 2021-08-03 LAB
ERYTHROCYTE [DISTWIDTH] IN BLOOD BY AUTOMATED COUNT: 13.2 %
HCT VFR BLD AUTO: 41 %
HGB BLD-MCNC: 13.4 G/DL
MCH RBC QN AUTO: 30.2 PG (ref 26–34)
MCHC RBC AUTO-ENTMCNC: 32.7 G/DL (ref 31–37)
MCV RBC AUTO: 92.3 FL
PLATELET # BLD AUTO: 141 10(3)UL (ref 150–450)
RBC # BLD AUTO: 4.44 X10(6)UL
WBC # BLD AUTO: 12.2 X10(3) UL (ref 4–11)

## 2021-08-03 PROCEDURE — 85014 HEMATOCRIT: CPT | Performed by: ORTHOPAEDIC SURGERY

## 2021-08-03 PROCEDURE — 97161 PT EVAL LOW COMPLEX 20 MIN: CPT

## 2021-08-03 PROCEDURE — 97535 SELF CARE MNGMENT TRAINING: CPT

## 2021-08-03 PROCEDURE — 85018 HEMOGLOBIN: CPT | Performed by: ORTHOPAEDIC SURGERY

## 2021-08-03 PROCEDURE — 85027 COMPLETE CBC AUTOMATED: CPT | Performed by: ORTHOPAEDIC SURGERY

## 2021-08-03 PROCEDURE — 97165 OT EVAL LOW COMPLEX 30 MIN: CPT

## 2021-08-03 PROCEDURE — 97530 THERAPEUTIC ACTIVITIES: CPT

## 2021-08-03 RX ORDER — HYDROCODONE BITARTRATE AND ACETAMINOPHEN 5; 325 MG/1; MG/1
1 TABLET ORAL EVERY 6 HOURS PRN
Status: DISCONTINUED | OUTPATIENT
Start: 2021-08-03 | End: 2021-08-03

## 2021-08-03 NOTE — PROGRESS NOTES
Orthopedic surgery progress note    Berny Wolff Patient Status:  Outpatient in a Bed    9/15/1950 MRN [id number]   [facility] 3SW-A Attending Griselda Espana MD   Hosp Day # 0 PCP Edwin Locke MD       Subjective:  No major complaints.

## 2021-08-03 NOTE — HOME CARE LIAISON
Patient setup with [address] pre-operatively. Residential to follow upon discharge. [facility] liaison to f/u with patient.

## 2021-08-03 NOTE — OCCUPATIONAL THERAPY NOTE
OCCUPATIONAL THERAPY QUICK EVALUATION - INPATIENT    Room Number: 363/363-A  Evaluation Date: 8/3/2021     Type of Evaluation: Quick Eval  Presenting Problem: R TKA    Physician Order: IP Consult to Occupational Therapy  Reason for Therapy:  ADL/IADL Dysfu activities. No device for amb within her condo, but used cane for outside the home. SUBJECTIVE   \"I don't have any pain. \"    Patient self-stated goal is to go home today.     OBJECTIVE  Precautions: None  Fall Risk: Standard fall risk    WEIGHT BEARIN Patient also educated on walk in shower transfers with good understanding and supervision for demonstration. Patient End of Session: Up in chair;Call light within reach; Needs met;RN aware of session/findings; All patient questions and concerns Zak You

## 2021-08-03 NOTE — HOME CARE LIAISON
Patient set up with [facility] pre-operatively. Met with patient at the bedside to discuss [facility] services. Pt confirmed she would still like to use [facility] on dc. Brochure provided and all questions answered. Will follow.

## 2021-08-03 NOTE — PLAN OF CARE
PT A/O, 96% ON 2L, USING IS, SR/SB WITH SLEEP, HR 40'S, RT KNEE AQUACEL DRESSING D/I, GEL ICE ON, DENIES N/T, UP TO BATHROOM VOIDING WITH WALKER/SB ASSIST, TOLERATING DIET, IV SL, SCDS ON, NO PAIN UNTIL THIS AM, GIVEN NORCO AT 0542, LABS THIS AM, INSTRUCTE

## 2021-08-03 NOTE — PROGRESS NOTES
Assumed care at 0700. Pt is A&Ox4. She is not having too much pain-having Norco.  Ambulating to the BR with [employer].  Possible DC later today. Rt. Knee aquacel dressing, ice applied.

## 2021-08-03 NOTE — PLAN OF CARE
New post op TKR. Pt in minimal pain 2/10 . Tolerating diet. A&0x4/,   Good movement to foot, on 3L  PT/OT to see in am. Tolerating diet.

## 2021-08-03 NOTE — PROGRESS NOTES
Went through discharge paperwork. No further questions. She was wheeled out to a private vehicle to home with daughter.

## 2021-08-03 NOTE — PHYSICAL THERAPY NOTE
PHYSICAL THERAPY QUICK EVALUATION - INPATIENT    Room Number: 363/363-A  Evaluation Date: 8/3/2021  Presenting Problem: s/p right TKA on 8/2/21  Physician Order: PT Eval and Treat    History of current admission:    Pt is 65year old female admitted on 8 risk    WEIGHT BEARING RESTRICTION  Weight Bearing Restriction: R lower extremity        R Lower Extremity: Weight Bearing as Tolerated       PAIN ASSESSMENT  Ratin  Location: right knee  Management Techniques: Activity promotion;Repositioning; Other (C x 0)  Supine to sit = mod indep from flat bed ( x 1)  Sit to supine = NT ( x 0)    Transfer Mobility:  Sit to stand = mod indep  ( x 3)  Stand to sit = mod indep  ( x 3)    Simulated car transfer = NT - discussed with pt.       VC's for expectations, t/f t

## 2021-08-03 NOTE — CM/SW NOTE
08/03/21 1000   CM/SW Referral Data   Referral Source Social Work (self-referral)   Reason for Referral Discharge planning   Discharge Needs   Anticipated D/C needs Home health care       HOME SITUATION  [facility]   Home Layout: One level  [facility]

## 2021-08-16 PROBLEM — Z96.651 STATUS POST TOTAL RIGHT KNEE REPLACEMENT: Status: ACTIVE | Noted: 2021-08-16

## 2021-10-04 ENCOUNTER — OFFICE VISIT (OUTPATIENT)
Dept: INTERNAL MEDICINE CLINIC | Facility: CLINIC | Age: 71
End: 2021-10-04
Payer: MEDICARE

## 2021-10-04 ENCOUNTER — LAB ENCOUNTER (OUTPATIENT)
Dept: LAB | Facility: HOSPITAL | Age: 71
End: 2021-10-04
Attending: INTERNAL MEDICINE
Payer: MEDICARE

## 2021-10-04 VITALS
BODY MASS INDEX: 34.97 KG/M2 | HEIGHT: 64 IN | SYSTOLIC BLOOD PRESSURE: 116 MMHG | DIASTOLIC BLOOD PRESSURE: 72 MMHG | TEMPERATURE: 98 F | RESPIRATION RATE: 20 BRPM | WEIGHT: 204.81 LBS | HEART RATE: 58 BPM

## 2021-10-04 DIAGNOSIS — Z12.11 COLON CANCER SCREENING: ICD-10-CM

## 2021-10-04 DIAGNOSIS — R94.39 ABNORMAL CARDIOVASCULAR STRESS TEST: ICD-10-CM

## 2021-10-04 DIAGNOSIS — I10 ESSENTIAL HYPERTENSION WITH GOAL BLOOD PRESSURE LESS THAN 140/90: ICD-10-CM

## 2021-10-04 DIAGNOSIS — G47.33 OSA (OBSTRUCTIVE SLEEP APNEA): ICD-10-CM

## 2021-10-04 DIAGNOSIS — Z00.00 ENCOUNTER FOR ANNUAL HEALTH EXAMINATION: Primary | ICD-10-CM

## 2021-10-04 DIAGNOSIS — Z96.651 S/P TKR (TOTAL KNEE REPLACEMENT), RIGHT: ICD-10-CM

## 2021-10-04 PROCEDURE — 85025 COMPLETE CBC W/AUTO DIFF WBC: CPT

## 2021-10-04 PROCEDURE — 80053 COMPREHEN METABOLIC PANEL: CPT

## 2021-10-04 PROCEDURE — 3008F BODY MASS INDEX DOCD: CPT | Performed by: INTERNAL MEDICINE

## 2021-10-04 PROCEDURE — 3078F DIAST BP <80 MM HG: CPT | Performed by: INTERNAL MEDICINE

## 2021-10-04 PROCEDURE — 3074F SYST BP LT 130 MM HG: CPT | Performed by: INTERNAL MEDICINE

## 2021-10-04 PROCEDURE — 99397 PER PM REEVAL EST PAT 65+ YR: CPT | Performed by: INTERNAL MEDICINE

## 2021-10-04 PROCEDURE — 84443 ASSAY THYROID STIM HORMONE: CPT

## 2021-10-04 PROCEDURE — 80061 LIPID PANEL: CPT

## 2021-10-04 PROCEDURE — G0439 PPPS, SUBSEQ VISIT: HCPCS | Performed by: INTERNAL MEDICINE

## 2021-10-04 PROCEDURE — 36415 COLL VENOUS BLD VENIPUNCTURE: CPT

## 2021-10-04 PROCEDURE — 96160 PT-FOCUSED HLTH RISK ASSMT: CPT | Performed by: INTERNAL MEDICINE

## 2021-10-04 RX ORDER — VALSARTAN AND HYDROCHLOROTHIAZIDE 320; 25 MG/1; MG/1
1 TABLET, FILM COATED ORAL DAILY
Qty: 90 TABLET | Refills: 3 | Status: SHIPPED | OUTPATIENT
Start: 2021-10-04

## 2021-10-04 RX ORDER — AMLODIPINE BESYLATE 10 MG/1
10 TABLET ORAL DAILY
Qty: 90 TABLET | Refills: 3 | Status: SHIPPED | OUTPATIENT
Start: 2021-10-04

## 2021-10-04 NOTE — PATIENT INSTRUCTIONS
Rohini Wolff's SCREENING SCHEDULE   Tests on this list are recommended by your physician but may not be covered, or covered at this frequency, by your insurer. Please check with your insurance carrier before scheduling to verify coverage.    PREVENT 12/05/2019      No recommendations at this time   Pap and Pelvic    Pap   Covered every 2 years for women at normal risk;  Annually if at high risk -  No recommendations at this time    Chlamydia Annually if high risk 12/02/2020  No recommendations at this http://www. [place]ph.state. [place]/public/books/advin.htm  A link to the [employer]. This site has a lot of good information including definitions of the different types of Advance Directives.  It also has the State forms available on it's webs

## 2021-10-04 NOTE — PROGRESS NOTES
HPI:   Erica Keating is a 66year old female who presents for a MA (Medicare Advantage) Supervisit (Once per calendar year). Patient is here for Medicare advantage [facility] visit. Also follow-up on chronic medical issues as listed below.   I last saw he Pack years: 25        Quit date: 1985        Years since quittin.1      Smokeless tobacco: Never Used         CAGE screening score of 0 on 10/4/2021, showing low risk of alcohol abuse.         Patient Care Team: Patient Care Team:  Teresa Bustamante problem, H/O: hysterectomy (1991), Hearing impairment, High blood pressure, Osteoarthritis, Other ill-defined conditions(939.89) (2010), Sleep apnea, Visual impairment, and Wears dentures.     She  has a past surgical history that includes tonsillectomy; ca sounds come from: No I misunderstand some words in a sentence and need to ask people to repeat themselves: Sometimes   I especially have trouble understanding the speech of women and children: No I have trouble understanding the speaker in a large room suc back: Neck supple. Right lower leg: No edema. Left lower leg: No edema. Lymphadenopathy:      Cervical: No cervical adenopathy. Skin:     General: Skin is warm and dry. Findings: No rash. Neurological:      Mental Status: She is alert. PANEL (14); Future  - LIPID PANEL; Future  - TSH W REFLEX TO FREE T4; Future    3. MARTHA (obstructive sleep apnea)  Patient does not use the CPAP. With considerable weight loss over the last year or 2, she likely does not really need it anymore. 4.  BMI 3 SCHEDULE   Tests on this list are recommended by your physician but may not be covered, or covered at this frequency, by your insurer. Please check with your insurance carrier before scheduling to verify coverage.    PREVENTATIVE SERVICES FREQUENCY &  COV time   Pap and Pelvic    Pap   Covered every 2 years for women at normal risk;  Annually if at high risk -  No recommendations at this time    Chlamydia Annually if high risk 12/02/2020  No recommendations at this time   Screening Mammogram    Mammogram

## 2021-11-04 ENCOUNTER — APPOINTMENT (OUTPATIENT)
Dept: LAB | Age: 71
End: 2021-11-04
Attending: INTERNAL MEDICINE
Payer: MEDICARE

## 2021-11-04 PROCEDURE — 82274 ASSAY TEST FOR BLOOD FECAL: CPT

## 2021-12-21 ENCOUNTER — ORDER TRANSCRIPTION (OUTPATIENT)
Dept: ADMINISTRATIVE | Facility: HOSPITAL | Age: 71
End: 2021-12-21

## 2021-12-21 DIAGNOSIS — Z12.31 ENCOUNTER FOR SCREENING MAMMOGRAM FOR MALIGNANT NEOPLASM OF BREAST: Primary | ICD-10-CM

## 2022-01-15 ENCOUNTER — HOSPITAL ENCOUNTER (OUTPATIENT)
Dept: MAMMOGRAPHY | Facility: HOSPITAL | Age: 72
Discharge: HOME | End: 2022-01-15
Attending: INTERNAL MEDICINE
Payer: MEDICARE

## 2022-01-15 DIAGNOSIS — Z12.31 ENCOUNTER FOR SCREENING MAMMOGRAM FOR MALIGNANT NEOPLASM OF BREAST: ICD-10-CM

## 2022-01-15 PROCEDURE — 77063 BREAST TOMOSYNTHESIS BI: CPT | Performed by: INTERNAL MEDICINE

## 2022-01-15 PROCEDURE — 77067 SCR MAMMO BI INCL CAD: CPT | Performed by: INTERNAL MEDICINE

## 2022-01-21 ENCOUNTER — OFFICE VISIT (OUTPATIENT)
Dept: INTERNAL MEDICINE CLINIC | Facility: CLINIC | Age: 72
End: 2022-01-21
Payer: MEDICARE

## 2022-01-21 ENCOUNTER — TELEPHONE (OUTPATIENT)
Dept: INTERNAL MEDICINE CLINIC | Facility: CLINIC | Age: 72
End: 2022-01-21

## 2022-01-21 VITALS
SYSTOLIC BLOOD PRESSURE: 131 MMHG | WEIGHT: 204.38 LBS | HEIGHT: 64 IN | DIASTOLIC BLOOD PRESSURE: 77 MMHG | HEART RATE: 57 BPM | BODY MASS INDEX: 34.89 KG/M2

## 2022-01-21 DIAGNOSIS — L89.159 DECUBITUS ULCER OF COCCYX, UNSPECIFIED PRESSURE ULCER STAGE: Primary | ICD-10-CM

## 2022-01-21 PROCEDURE — 3075F SYST BP GE 130 - 139MM HG: CPT | Performed by: INTERNAL MEDICINE

## 2022-01-21 PROCEDURE — 3008F BODY MASS INDEX DOCD: CPT | Performed by: INTERNAL MEDICINE

## 2022-01-21 PROCEDURE — 3078F DIAST BP <80 MM HG: CPT | Performed by: INTERNAL MEDICINE

## 2022-01-21 PROCEDURE — 99213 OFFICE O/P EST LOW 20 MIN: CPT | Performed by: INTERNAL MEDICINE

## 2022-01-21 NOTE — TELEPHONE ENCOUNTER
Patient calling, confirmed name and . Patient states that her tailbone is painful and she has history of a pilonidal cyst. She is unable to visualize the area. Pain = 7/10. Denies fever or symptoms of infection.  Able to speak clearly on the phone wit

## 2022-01-21 NOTE — PATIENT INSTRUCTIONS
Please try to regularly take the pressure off your tailbone when you are sitting by using a soft doughnut. Apply Neosporin and a dry dressing daily. Call or return if not soon better.

## 2022-02-25 ENCOUNTER — PATIENT MESSAGE (OUTPATIENT)
Dept: INTERNAL MEDICINE CLINIC | Facility: CLINIC | Age: 72
End: 2022-02-25

## 2022-02-25 NOTE — TELEPHONE ENCOUNTER
Patricia Wright RN 2/25/2022 11:39 AM CST        ----- Message -----  From: Arpit Stanton  Sent: 2/25/2022 9:52 AM CST  To: Em Rn Triage  Subject: referral for cataract care     sorry to bother you with this again. apparently the [facility] is not in my insurance network. would you mind sending a referral to the [facility] in [address] phone [phone]. Thanks so much

## 2022-07-21 ENCOUNTER — TELEPHONE (OUTPATIENT)
Dept: INTERNAL MEDICINE CLINIC | Facility: CLINIC | Age: 72
End: 2022-07-21

## 2022-07-21 NOTE — TELEPHONE ENCOUNTER
Patient dropped off parking placard form. Please call patient when ready for pickup.  Form in Dr. Jenny Stephenson box

## 2022-08-24 ENCOUNTER — TELEPHONE (OUTPATIENT)
Dept: CASE MANAGEMENT | Age: 72
End: 2022-08-24

## 2022-08-24 DIAGNOSIS — Z12.11 COLON CANCER SCREENING: Primary | ICD-10-CM

## 2022-10-21 ENCOUNTER — LAB ENCOUNTER (OUTPATIENT)
Dept: LAB | Age: 72
End: 2022-10-21
Attending: INTERNAL MEDICINE
Payer: MEDICARE

## 2022-10-21 ENCOUNTER — OFFICE VISIT (OUTPATIENT)
Dept: INTERNAL MEDICINE CLINIC | Facility: CLINIC | Age: 72
End: 2022-10-21
Payer: MEDICARE

## 2022-10-21 VITALS
RESPIRATION RATE: 20 BRPM | SYSTOLIC BLOOD PRESSURE: 128 MMHG | BODY MASS INDEX: 33.63 KG/M2 | DIASTOLIC BLOOD PRESSURE: 76 MMHG | HEART RATE: 62 BPM | WEIGHT: 197 LBS | TEMPERATURE: 97 F | HEIGHT: 64 IN

## 2022-10-21 DIAGNOSIS — Z12.11 COLON CANCER SCREENING: ICD-10-CM

## 2022-10-21 DIAGNOSIS — Z12.31 ENCOUNTER FOR SCREENING MAMMOGRAM FOR BREAST CANCER: ICD-10-CM

## 2022-10-21 DIAGNOSIS — Z00.00 ENCOUNTER FOR ANNUAL HEALTH EXAMINATION: Primary | ICD-10-CM

## 2022-10-21 DIAGNOSIS — Z96.651 S/P TKR (TOTAL KNEE REPLACEMENT), RIGHT: ICD-10-CM

## 2022-10-21 DIAGNOSIS — R20.2 NUMBNESS AND TINGLING OF BOTH LEGS: ICD-10-CM

## 2022-10-21 DIAGNOSIS — G47.33 OSA (OBSTRUCTIVE SLEEP APNEA): ICD-10-CM

## 2022-10-21 DIAGNOSIS — I10 ESSENTIAL HYPERTENSION WITH GOAL BLOOD PRESSURE LESS THAN 140/90: ICD-10-CM

## 2022-10-21 DIAGNOSIS — R20.0 NUMBNESS AND TINGLING OF BOTH LEGS: ICD-10-CM

## 2022-10-21 DIAGNOSIS — R94.39 ABNORMAL CARDIOVASCULAR STRESS TEST: ICD-10-CM

## 2022-10-21 LAB
ALBUMIN SERPL-MCNC: 3.7 G/DL (ref 3.4–5)
ALBUMIN/GLOB SERPL: 1.1 {RATIO} (ref 1–2)
ALP LIVER SERPL-CCNC: 73 U/L
ALT SERPL-CCNC: 16 U/L
ANION GAP SERPL CALC-SCNC: 4 MMOL/L (ref 0–18)
AST SERPL-CCNC: 13 U/L (ref 15–37)
BASOPHILS # BLD AUTO: 0.04 X10(3) UL (ref 0–0.2)
BASOPHILS NFR BLD AUTO: 0.8 %
BILIRUB SERPL-MCNC: 0.7 MG/DL (ref 0.1–2)
BUN BLD-MCNC: 15 MG/DL (ref 7–18)
BUN/CREAT SERPL: 21.7 (ref 10–20)
CALCIUM BLD-MCNC: 9.5 MG/DL (ref 8.5–10.1)
CHLORIDE SERPL-SCNC: 106 MMOL/L (ref 98–112)
CHOLEST SERPL-MCNC: 151 MG/DL (ref ?–200)
CO2 SERPL-SCNC: 30 MMOL/L (ref 21–32)
CREAT BLD-MCNC: 0.69 MG/DL
DEPRECATED RDW RBC AUTO: 46.5 FL (ref 35.1–46.3)
EOSINOPHIL # BLD AUTO: 0.16 X10(3) UL (ref 0–0.7)
EOSINOPHIL NFR BLD AUTO: 3.1 %
ERYTHROCYTE [DISTWIDTH] IN BLOOD BY AUTOMATED COUNT: 13.3 % (ref 11–15)
FASTING PATIENT LIPID ANSWER: YES
FASTING STATUS PATIENT QL REPORTED: YES
GFR SERPLBLD BASED ON 1.73 SQ M-ARVRAT: 91 ML/MIN/1.73M2 (ref 60–?)
GLOBULIN PLAS-MCNC: 3.3 G/DL (ref 2.8–4.4)
GLUCOSE BLD-MCNC: 92 MG/DL (ref 70–99)
HCT VFR BLD AUTO: 45 %
HDLC SERPL-MCNC: 63 MG/DL (ref 40–59)
HEMOCCULT STL QL: NEGATIVE
HGB BLD-MCNC: 14.8 G/DL
IMM GRANULOCYTES # BLD AUTO: 0.01 X10(3) UL (ref 0–1)
IMM GRANULOCYTES NFR BLD: 0.2 %
LDLC SERPL CALC-MCNC: 75 MG/DL (ref ?–100)
LYMPHOCYTES # BLD AUTO: 1.37 X10(3) UL (ref 1–4)
LYMPHOCYTES NFR BLD AUTO: 26.9 %
MCH RBC QN AUTO: 31 PG (ref 26–34)
MCHC RBC AUTO-ENTMCNC: 32.9 G/DL (ref 31–37)
MCV RBC AUTO: 94.3 FL
MONOCYTES # BLD AUTO: 0.38 X10(3) UL (ref 0.1–1)
MONOCYTES NFR BLD AUTO: 7.5 %
NEUTROPHILS # BLD AUTO: 3.13 X10 (3) UL (ref 1.5–7.7)
NEUTROPHILS # BLD AUTO: 3.13 X10(3) UL (ref 1.5–7.7)
NEUTROPHILS NFR BLD AUTO: 61.5 %
NONHDLC SERPL-MCNC: 88 MG/DL (ref ?–130)
OSMOLALITY SERPL CALC.SUM OF ELEC: 290 MOSM/KG (ref 275–295)
PLATELET # BLD AUTO: 163 10(3)UL (ref 150–450)
POTASSIUM SERPL-SCNC: 3.8 MMOL/L (ref 3.5–5.1)
PROT SERPL-MCNC: 7 G/DL (ref 6.4–8.2)
RBC # BLD AUTO: 4.77 X10(6)UL
SODIUM SERPL-SCNC: 140 MMOL/L (ref 136–145)
TRIGL SERPL-MCNC: 65 MG/DL (ref 30–149)
TSI SER-ACNC: 1.37 MIU/ML (ref 0.36–3.74)
VIT B12 SERPL-MCNC: 374 PG/ML (ref 193–986)
VLDLC SERPL CALC-MCNC: 10 MG/DL (ref 0–30)
WBC # BLD AUTO: 5.1 X10(3) UL (ref 4–11)

## 2022-10-21 PROCEDURE — 80061 LIPID PANEL: CPT

## 2022-10-21 PROCEDURE — 80053 COMPREHEN METABOLIC PANEL: CPT

## 2022-10-21 PROCEDURE — 84443 ASSAY THYROID STIM HORMONE: CPT

## 2022-10-21 PROCEDURE — 36415 COLL VENOUS BLD VENIPUNCTURE: CPT

## 2022-10-21 PROCEDURE — 82607 VITAMIN B-12: CPT

## 2022-10-21 PROCEDURE — 82274 ASSAY TEST FOR BLOOD FECAL: CPT

## 2022-10-21 PROCEDURE — 85025 COMPLETE CBC W/AUTO DIFF WBC: CPT

## 2022-10-21 RX ORDER — KETOROLAC TROMETHAMINE 4 MG/ML
SOLUTION/ DROPS OPHTHALMIC
COMMUNITY
Start: 2022-05-12 | End: 2022-10-21 | Stop reason: ALTCHOICE

## 2022-10-21 RX ORDER — MOXIFLOXACIN 5 MG/ML
SOLUTION/ DROPS OPHTHALMIC
COMMUNITY
Start: 2022-05-12 | End: 2022-10-21 | Stop reason: ALTCHOICE

## 2022-10-21 RX ORDER — PREDNISOLONE ACETATE 10 MG/ML
SUSPENSION/ DROPS OPHTHALMIC
COMMUNITY
Start: 2022-05-12 | End: 2022-10-21 | Stop reason: ALTCHOICE

## 2022-10-30 RX ORDER — AMLODIPINE BESYLATE 10 MG/1
10 TABLET ORAL DAILY
Qty: 90 TABLET | Refills: 1 | Status: SHIPPED | OUTPATIENT
Start: 2022-10-30

## 2022-10-30 RX ORDER — VALSARTAN AND HYDROCHLOROTHIAZIDE 320; 25 MG/1; MG/1
1 TABLET, FILM COATED ORAL DAILY
Qty: 90 TABLET | Refills: 1 | Status: SHIPPED | OUTPATIENT
Start: 2022-10-30

## 2023-01-12 ENCOUNTER — OFFICE VISIT (OUTPATIENT)
Dept: NEUROLOGY | Facility: CLINIC | Age: 73
End: 2023-01-12
Payer: MEDICARE

## 2023-01-12 ENCOUNTER — LAB ENCOUNTER (OUTPATIENT)
Dept: LAB | Facility: HOSPITAL | Age: 73
End: 2023-01-12
Attending: Other
Payer: MEDICARE

## 2023-01-12 VITALS — SYSTOLIC BLOOD PRESSURE: 112 MMHG | DIASTOLIC BLOOD PRESSURE: 74 MMHG | OXYGEN SATURATION: 100 % | HEART RATE: 60 BPM

## 2023-01-12 DIAGNOSIS — G62.9 NEUROPATHY: ICD-10-CM

## 2023-01-12 DIAGNOSIS — G62.9 NEUROPATHY: Primary | ICD-10-CM

## 2023-01-12 LAB
CK SERPL-CCNC: 56 U/L
FOLATE SERPL-MCNC: 16.6 NG/ML (ref 8.7–?)
HCYS SERPL-SCNC: 8.4 UMOL/L (ref 3.2–10.7)

## 2023-01-12 PROCEDURE — 36415 COLL VENOUS BLD VENIPUNCTURE: CPT

## 2023-01-12 PROCEDURE — 83921 ORGANIC ACID SINGLE QUANT: CPT

## 2023-01-12 PROCEDURE — 3078F DIAST BP <80 MM HG: CPT | Performed by: OTHER

## 2023-01-12 PROCEDURE — 83516 IMMUNOASSAY NONANTIBODY: CPT

## 2023-01-12 PROCEDURE — 86235 NUCLEAR ANTIGEN ANTIBODY: CPT

## 2023-01-12 PROCEDURE — 3074F SYST BP LT 130 MM HG: CPT | Performed by: OTHER

## 2023-01-12 PROCEDURE — 86334 IMMUNOFIX E-PHORESIS SERUM: CPT

## 2023-01-12 PROCEDURE — 83090 ASSAY OF HOMOCYSTEINE: CPT

## 2023-01-12 PROCEDURE — 82746 ASSAY OF FOLIC ACID SERUM: CPT

## 2023-01-12 PROCEDURE — 99205 OFFICE O/P NEW HI 60 MIN: CPT | Performed by: OTHER

## 2023-01-12 PROCEDURE — 82550 ASSAY OF CK (CPK): CPT

## 2023-01-12 PROCEDURE — 86036 ANCA SCREEN EACH ANTIBODY: CPT

## 2023-01-12 PROCEDURE — 84446 ASSAY OF VITAMIN E: CPT

## 2023-01-12 PROCEDURE — 84425 ASSAY OF VITAMIN B-1: CPT

## 2023-01-12 RX ORDER — ACETAMINOPHEN 325 MG/1
650 TABLET ORAL 2 TIMES DAILY PRN
COMMUNITY

## 2023-01-13 LAB
ENA SS-A IGG SER IA-ACNC: 0.8 U/ML
ENA SS-B IGG SER IA-ACNC: <0.4 U/ML

## 2023-01-15 LAB
ALPHA-TOCOPHEROL (VIT E) -MG/L: 9.5 MG/L
GAMMA-TOCOPHEROL (VIT E) -MG/L: 0.2 MG/L

## 2023-01-16 LAB
MYELOPEROX ANTIBODIES, IGG: 0 AU/ML
SERINE PROTEASE 3, IGG: 0 AU/ML

## 2023-01-17 LAB — MMA: 0.5 UMOL/L

## 2023-01-19 LAB — VITAMIN B1 (THIAMINE), WHOLE B: 127 NMOL/L

## 2023-01-31 ENCOUNTER — HOSPITAL ENCOUNTER (OUTPATIENT)
Dept: MAMMOGRAPHY | Facility: HOSPITAL | Age: 73
Discharge: HOME | End: 2023-01-31
Attending: INTERNAL MEDICINE
Payer: MEDICARE

## 2023-01-31 DIAGNOSIS — Z12.31 ENCOUNTER FOR SCREENING MAMMOGRAM FOR BREAST CANCER: ICD-10-CM

## 2023-01-31 PROCEDURE — 77063 BREAST TOMOSYNTHESIS BI: CPT | Performed by: INTERNAL MEDICINE

## 2023-01-31 PROCEDURE — 77067 SCR MAMMO BI INCL CAD: CPT | Performed by: INTERNAL MEDICINE

## 2023-04-06 NOTE — PROGRESS NOTES
Geovani Mahmood is a 66year old female. Patient presents with:  Pain: pt stts she has pain on tailbone    HPI:   For the past 2 months she has had persistent pain at the tip of her tailbone, most noticeable when she sits.   She had a right knee replacemen QUESTION OF FIXED DEFECT AT [facility]. PER.  CLEMENTE   • Sleep apnea     had CPAP but has not used in several years   • Visual impairment     glasses   • Wears dentures     upper and lower     Past Surgical History:   Procedure Laterality Date   • ARTHROSCOPY OF Ronny Osborne VITALS:   T(C): 36.9 (04-06-23 @ 17:06), Max: 36.9 (04-06-23 @ 16:47)  HR: 83 (04-06-23 @ 17:06) (83 - 90)  BP: 158/86 (04-06-23 @ 17:06) (122/41 - 158/86)  RR: 19 (04-06-23 @ 17:06) (18 - 19)  SpO2: 97% (04-06-23 @ 17:06) (97% - 97%)    PHYSICAL EXAM:     GENERAL: NAD, lying in bed comfortably.  HEAD:  Atraumatic, normocephalic.  EYES: PERRL, conjunctiva and sclera clear.  ENT: Moist mucous membranes.  NECK: Supple, no LAD.  CHEST/LUNG: CTAB. No rales, rhonchi, wheezing, or rubs. Unlabored respirations.  HEART: Irregular rhythm, crescendo-decrescendo murmur loudest in RUSB, S1/S2  ABDOMEN: Soft, nontender, nondistended, no organomegaly. Normoactive bowel sounds.  EXTREMITIES:  2+ peripheral pulses b/l. 2+ BLE pitting.  Neurological:  AAOx3, no focal deficits.   SKIN: , LLE venous stasis changes.  PSYCH: Normal affect and mood.

## 2023-06-08 RX ORDER — VALSARTAN AND HYDROCHLOROTHIAZIDE 320; 25 MG/1; MG/1
1 TABLET, FILM COATED ORAL EVERY MORNING
Qty: 90 TABLET | Refills: 1 | Status: SHIPPED | OUTPATIENT
Start: 2023-06-08

## 2023-06-08 RX ORDER — AMLODIPINE BESYLATE 10 MG/1
10 TABLET ORAL EVERY MORNING
Qty: 90 TABLET | Refills: 1 | Status: SHIPPED | OUTPATIENT
Start: 2023-06-08

## 2023-06-08 NOTE — TELEPHONE ENCOUNTER
Please review. Protocol failed / Has no protocol. Future Appointments   Date Time Provider Elana Rowland   10/20/2023  9:00 AM Theodor Brittle, MD [facility]      Requested Prescriptions   Pending Prescriptions Disp Refills    VALSARTAN-HYDROCHLOROTHIAZIDE 320-25 MG Oral Tab [Pharmacy Med Name: VALSARTAN/HYDROCHLOROTHIAZIDE 320-25 MG Tablet] 90 tablet 1     Sig: TAKE 1 TABLET EVERY MORNING       Hypertensive Medications Protocol Failed - 6/7/2023  8:58 AM        Failed - CMP or BMP in past 6 months     No results found for this or any previous visit (from the past 4392 hour(s)).               Failed - In person appointment or virtual visit in the past 6 months     Recent Outpatient Visits              4 months ago Neuropathy    [address], [place]    Office Visit    7 months ago Encounter for annual health examination    [facility] Theodor Brittle, MD    Office Visit    1 year ago Decubitus ulcer of coccyx, unspecified pressure ulcer stage    [facility], [address], Joaquim De La Torre MD    Office Visit    1 year ago Encounter for annual health examination    [address] [facility], Skye Rai MD    Office Visit    1 year ago Status post total right knee replacement    [facility]ie Boggs, Wilfrid Salas, PT    Office Visit          Future Appointments         Provider Department Appt Notes    In 4 months Theodor Brittle, MD [facility], [address] - In person appointment in the past 12 or next 3 months     Recent Outpatient Visits              4 months ago Neuropathy    [facility], [address], [place], [place]    Office Visit    7 months ago Encounter for annual health examination    [facility], [address] Anca Birmingham MD    Office Visit    1 year ago Decubitus ulcer of coccyx, unspecified pressure ulcer stage    [facility], [address], Wilder Osuna MD    Office Visit    1 year ago Encounter for annual health examination    [facility], Jaylon Toscano MD    Office Visit    1 year ago Status post total right knee replacement    Zion Nunn, Alethia Bosworth, PT    Office Visit          Future Appointments         Provider Department Appt Notes    In 4 months Carey Myers MD [facility], [address] [facility]                Passed - Last BP reading less than 140/90     BP Readings from Last 1 Encounters:  23 : 112/74                Passed - EGFRCR or GFRNAA > 50     GFR Evaluation  EGFRCR: 91 , resulted on 10/21/2022              AMLODIPINE 10 MG Oral Tab [Pharmacy Med Name: AMLODIPINE BESYLATE 10 MG Tablet] 90 tablet 1     Sig: TAKE 1 TABLET EVERY MORNING       Hypertensive Medications Protocol Failed - 2023  8:58 AM        Failed - CMP or BMP in past 6 months     No results found for this or any previous visit (from the past 4392 hour(s)).               Failed - In person appointment or virtual visit in the past 6 months     Recent Outpatient Visits              4 months ago Neuropathy    [facility] [place], [place]    Office Visit    7 months ago Encounter for annual health examination    [facility] Minh, [address] [facility] Carey Myers MD    Office Visit    1 year ago Decubitus ulcer of coccyx, unspecified pressure ulcer stage    [facility], [address], Wilder Osuna MD    Office Visit    1 year ago Encounter for annual health examination    Amrik Enciso MD    Office Visit    1 year ago Status post total right knee replacement    Physical Therapy - [facility] Dr. Sina Casey, Gabby Whitley, PT    Office Visit          Future Appointments         Provider Department Appt Notes    In 4 months Gloria De Los Santos MD [address], [address] - In person appointment in the past 12 or next 3 months     Recent Outpatient Visits              4 months ago Neuropathy    [address], [address], [place], [place]    Office Visit    7 months ago Encounter for annual health examination    Nelma Eisenmenger, Rangel Medina MD    Office Visit    1 year ago Decubitus ulcer of coccyx, unspecified pressure ulcer stage    [facility], [address], Mary Love MD    Office Visit    1 year ago Encounter for annual health examination    [address] [place] [address], Rangel Medina MD    Office Visit    1 year ago Status post total right knee replacement    Minor Casey, Gabby Whitley, PT    Office Visit          Future Appointments         Provider Department Appt Notes    In 4 months Gloria De Los Santos MD [address], [address]                Passed - Last BP reading less than 140/90     BP Readings from Last 1 Encounters:  01/12/23 : 112/74                Passed - EGFRCR or GFRNAA > 50     GFR Evaluation  EGFRCR: 91 , resulted on 10/21/2022               Future Appointments         Provider Department Appt Notes    In 4 months Gloria De Los Santos MD [address], [address]            Recent Outpatient Visits              4 months ago Neuropathy    [address], [address], [place], [place]    Office Visit    7 months ago Encounter for annual health examination    [address], [address] MD Office Visit    1 year ago Decubitus ulcer of coccyx, unspecified pressure ulcer stage    [facility], [address], Frankie Fernandez MD    Office Visit    1 year ago Encounter for annual health examination    [address] [place] [address], Everette Man MD    Office Visit    1 year ago Status post total right knee replacement    Physical Therapy - [facility]erta, Radha Shipley, PT    Office Visit

## 2023-10-20 ENCOUNTER — LAB ENCOUNTER (OUTPATIENT)
Dept: LAB | Age: 73
End: 2023-10-20
Attending: INTERNAL MEDICINE

## 2023-10-20 ENCOUNTER — OFFICE VISIT (OUTPATIENT)
Dept: INTERNAL MEDICINE CLINIC | Facility: CLINIC | Age: 73
End: 2023-10-20
Payer: MEDICARE

## 2023-10-20 VITALS
RESPIRATION RATE: 20 BRPM | WEIGHT: 215 LBS | HEIGHT: 64 IN | DIASTOLIC BLOOD PRESSURE: 74 MMHG | TEMPERATURE: 97 F | SYSTOLIC BLOOD PRESSURE: 130 MMHG | HEART RATE: 60 BPM | BODY MASS INDEX: 36.7 KG/M2

## 2023-10-20 DIAGNOSIS — I10 ESSENTIAL HYPERTENSION WITH GOAL BLOOD PRESSURE LESS THAN 140/90: ICD-10-CM

## 2023-10-20 DIAGNOSIS — G62.9 NEUROPATHY: ICD-10-CM

## 2023-10-20 DIAGNOSIS — R94.39 ABNORMAL CARDIOVASCULAR STRESS TEST: ICD-10-CM

## 2023-10-20 DIAGNOSIS — Z00.00 ENCOUNTER FOR ANNUAL HEALTH EXAMINATION: Primary | ICD-10-CM

## 2023-10-20 DIAGNOSIS — G47.33 OSA (OBSTRUCTIVE SLEEP APNEA): ICD-10-CM

## 2023-10-20 DIAGNOSIS — E66.01 CLASS 2 SEVERE OBESITY WITH SERIOUS COMORBIDITY AND BODY MASS INDEX (BMI) OF 36.0 TO 36.9 IN ADULT, UNSPECIFIED OBESITY TYPE: ICD-10-CM

## 2023-10-20 DIAGNOSIS — Z12.11 COLON CANCER SCREENING: ICD-10-CM

## 2023-10-20 LAB
ALBUMIN SERPL-MCNC: 3.6 G/DL (ref 3.4–5)
ALBUMIN/GLOB SERPL: 1.1 {RATIO} (ref 1–2)
ALP LIVER SERPL-CCNC: 70 U/L
ALT SERPL-CCNC: 14 U/L
ANION GAP SERPL CALC-SCNC: 5 MMOL/L (ref 0–18)
AST SERPL-CCNC: 14 U/L (ref 15–37)
BASOPHILS # BLD AUTO: 0.04 X10(3) UL (ref 0–0.2)
BASOPHILS NFR BLD AUTO: 0.7 %
BILIRUB SERPL-MCNC: 0.7 MG/DL (ref 0.1–2)
BUN BLD-MCNC: 18 MG/DL (ref 7–18)
BUN/CREAT SERPL: 22.8 (ref 10–20)
CALCIUM BLD-MCNC: 9.2 MG/DL (ref 8.5–10.1)
CHLORIDE SERPL-SCNC: 107 MMOL/L (ref 98–112)
CHOLEST SERPL-MCNC: 150 MG/DL (ref ?–200)
CO2 SERPL-SCNC: 29 MMOL/L (ref 21–32)
CREAT BLD-MCNC: 0.79 MG/DL
DEPRECATED RDW RBC AUTO: 46.4 FL (ref 35.1–46.3)
EGFRCR SERPLBLD CKD-EPI 2021: 78 ML/MIN/1.73M2 (ref 60–?)
EOSINOPHIL # BLD AUTO: 0.21 X10(3) UL (ref 0–0.7)
EOSINOPHIL NFR BLD AUTO: 3.5 %
ERYTHROCYTE [DISTWIDTH] IN BLOOD BY AUTOMATED COUNT: 13.4 % (ref 11–15)
FASTING PATIENT LIPID ANSWER: YES
FASTING STATUS PATIENT QL REPORTED: YES
GLOBULIN PLAS-MCNC: 3.2 G/DL (ref 2.8–4.4)
GLUCOSE BLD-MCNC: 90 MG/DL (ref 70–99)
HCT VFR BLD AUTO: 43.5 %
HDLC SERPL-MCNC: 62 MG/DL (ref 40–59)
HGB BLD-MCNC: 14.2 G/DL
IMM GRANULOCYTES # BLD AUTO: 0.01 X10(3) UL (ref 0–1)
IMM GRANULOCYTES NFR BLD: 0.2 %
LDLC SERPL CALC-MCNC: 77 MG/DL (ref ?–100)
LYMPHOCYTES # BLD AUTO: 1.64 X10(3) UL (ref 1–4)
LYMPHOCYTES NFR BLD AUTO: 27.1 %
MCH RBC QN AUTO: 30.6 PG (ref 26–34)
MCHC RBC AUTO-ENTMCNC: 32.6 G/DL (ref 31–37)
MCV RBC AUTO: 93.8 FL
MONOCYTES # BLD AUTO: 0.44 X10(3) UL (ref 0.1–1)
MONOCYTES NFR BLD AUTO: 7.3 %
NEUTROPHILS # BLD AUTO: 3.71 X10 (3) UL (ref 1.5–7.7)
NEUTROPHILS # BLD AUTO: 3.71 X10(3) UL (ref 1.5–7.7)
NEUTROPHILS NFR BLD AUTO: 61.2 %
NONHDLC SERPL-MCNC: 88 MG/DL (ref ?–130)
OSMOLALITY SERPL CALC.SUM OF ELEC: 293 MOSM/KG (ref 275–295)
PLATELET # BLD AUTO: 155 10(3)UL (ref 150–450)
POTASSIUM SERPL-SCNC: 3.8 MMOL/L (ref 3.5–5.1)
PROT SERPL-MCNC: 6.8 G/DL (ref 6.4–8.2)
RBC # BLD AUTO: 4.64 X10(6)UL
SODIUM SERPL-SCNC: 141 MMOL/L (ref 136–145)
TRIGL SERPL-MCNC: 51 MG/DL (ref 30–149)
TSI SER-ACNC: 1.39 MIU/ML (ref 0.36–3.74)
VIT B12 SERPL-MCNC: 357 PG/ML (ref 193–986)
VLDLC SERPL CALC-MCNC: 8 MG/DL (ref 0–30)
WBC # BLD AUTO: 6.1 X10(3) UL (ref 4–11)

## 2023-10-20 PROCEDURE — 3075F SYST BP GE 130 - 139MM HG: CPT | Performed by: INTERNAL MEDICINE

## 2023-10-20 PROCEDURE — 1159F MED LIST DOCD IN RCRD: CPT | Performed by: INTERNAL MEDICINE

## 2023-10-20 PROCEDURE — 80053 COMPREHEN METABOLIC PANEL: CPT

## 2023-10-20 PROCEDURE — 36415 COLL VENOUS BLD VENIPUNCTURE: CPT

## 2023-10-20 PROCEDURE — 3078F DIAST BP <80 MM HG: CPT | Performed by: INTERNAL MEDICINE

## 2023-10-20 PROCEDURE — 1160F RVW MEDS BY RX/DR IN RCRD: CPT | Performed by: INTERNAL MEDICINE

## 2023-10-20 PROCEDURE — 99213 OFFICE O/P EST LOW 20 MIN: CPT | Performed by: INTERNAL MEDICINE

## 2023-10-20 PROCEDURE — 82607 VITAMIN B-12: CPT

## 2023-10-20 PROCEDURE — 85025 COMPLETE CBC W/AUTO DIFF WBC: CPT

## 2023-10-20 PROCEDURE — 1170F FXNL STATUS ASSESSED: CPT | Performed by: INTERNAL MEDICINE

## 2023-10-20 PROCEDURE — 84443 ASSAY THYROID STIM HORMONE: CPT

## 2023-10-20 PROCEDURE — 80061 LIPID PANEL: CPT

## 2023-10-20 PROCEDURE — 3008F BODY MASS INDEX DOCD: CPT | Performed by: INTERNAL MEDICINE

## 2023-10-20 PROCEDURE — 1125F AMNT PAIN NOTED PAIN PRSNT: CPT | Performed by: INTERNAL MEDICINE

## 2023-10-20 PROCEDURE — 96160 PT-FOCUSED HLTH RISK ASSMT: CPT | Performed by: INTERNAL MEDICINE

## 2023-10-20 PROCEDURE — G0439 PPPS, SUBSEQ VISIT: HCPCS | Performed by: INTERNAL MEDICINE

## 2023-10-20 RX ORDER — AMLODIPINE BESYLATE 10 MG/1
10 TABLET ORAL EVERY MORNING
Qty: 90 TABLET | Refills: 3 | Status: SHIPPED | OUTPATIENT
Start: 2023-10-20

## 2023-10-20 RX ORDER — VALSARTAN AND HYDROCHLOROTHIAZIDE 320; 25 MG/1; MG/1
1 TABLET, FILM COATED ORAL EVERY MORNING
Qty: 90 TABLET | Refills: 3 | Status: SHIPPED | OUTPATIENT
Start: 2023-10-20

## 2023-11-07 PROBLEM — E66.01 SEVERE OBESITY (BMI 35.0-39.9) WITH COMORBIDITY (HCC): Chronic | Status: RESOLVED | Noted: 2021-06-22 | Resolved: 2023-11-07

## 2023-11-15 ENCOUNTER — APPOINTMENT (OUTPATIENT)
Dept: LAB | Facility: HOSPITAL | Age: 73
End: 2023-11-15
Attending: INTERNAL MEDICINE
Payer: MEDICARE

## 2023-11-15 PROCEDURE — 82274 ASSAY TEST FOR BLOOD FECAL: CPT

## 2023-11-16 LAB — HEMOCCULT STL QL: NEGATIVE

## 2023-12-25 LAB — AMB EXT COVID-19 RESULT: DETECTED

## 2023-12-26 ENCOUNTER — NURSE TRIAGE (OUTPATIENT)
Dept: INTERNAL MEDICINE CLINIC | Facility: CLINIC | Age: 73
End: 2023-12-26

## 2023-12-26 NOTE — TELEPHONE ENCOUNTER
Action Requested: Summary for Provider     []  Critical Lab, Recommendations Needed  [] Need Additional Advice  [x]   FYI    []   Need Orders  [] Need Medications Sent to Pharmacy  []  Other     SUMMARY: Per protocol, patient should be able to manage symptoms with home care. Reason for call: Covid (/)  Onset: 12/25    Patient reports of testing positive for Covid yesterday. States feeling like a \"really bad flu\". Complained of pressure on top of head, headache, cough, runny nose, bodyache, and no appetite. Denies fever. She has been taking Tylenol. Denies chest pain, shortness of breath or difficulty of breathing. Covid isolation guidelines per CDC and supportive home care instructions given and discussed. Explained that appointment is needed in order to be evaluated to determine if she is a candidate for the medication Paxlovid. She will call back if symptoms worsen. Patient verbalized understanding and agreed with plan of care.      Reason for Disposition   [1] COVID-19 diagnosed by positive lab test (e.g., PCR, rapid self-test kit) AND [2] mild symptoms (e.g., cough, fever, others) AND [8] no complications or SOB    Protocols used: Coronavirus (COVID-19) Diagnosed or [id number]

## 2023-12-28 NOTE — TELEPHONE ENCOUNTER
Pt tested positive for COVID on 12/25. THat day is when the symptoms started. Currently, she has no appetite; low grade fever; head is swimming; chest hurts with cough. She wonders about treatment options. Discussed Paxlovid. She is on amlodipine so there is a potential interaction with that. Patient request the name of over-the-counter medications. She can take Mucinex DM as well as Coricidin. Continue with the Tylenol. Call tonight if things are worsening otherwise call with an update in the next day or 2. We will not send any Paxlovid prescription in at this time.

## 2024-01-25 ENCOUNTER — TELEPHONE (OUTPATIENT)
Dept: INTERNAL MEDICINE CLINIC | Facility: CLINIC | Age: 74
End: 2024-01-25

## 2024-01-25 DIAGNOSIS — Z12.31 SCREENING MAMMOGRAM, ENCOUNTER FOR: Primary | ICD-10-CM

## 2024-02-24 ENCOUNTER — HOSPITAL ENCOUNTER (OUTPATIENT)
Dept: MAMMOGRAPHY | Facility: HOSPITAL | Age: 74
Discharge: HOME | End: 2024-02-24
Attending: INTERNAL MEDICINE
Payer: MEDICARE

## 2024-02-24 DIAGNOSIS — Z12.31 SCREENING MAMMOGRAM, ENCOUNTER FOR: ICD-10-CM

## 2024-02-24 PROCEDURE — 77063 BREAST TOMOSYNTHESIS BI: CPT | Performed by: INTERNAL MEDICINE

## 2024-02-24 PROCEDURE — 77067 SCR MAMMO BI INCL CAD: CPT | Performed by: INTERNAL MEDICINE

## 2024-05-13 ENCOUNTER — OFFICE VISIT (OUTPATIENT)
Dept: INTERNAL MEDICINE CLINIC | Facility: CLINIC | Age: 74
End: 2024-05-13
Payer: MEDICARE

## 2024-05-13 ENCOUNTER — HOSPITAL ENCOUNTER (OUTPATIENT)
Dept: GENERAL RADIOLOGY | Age: 74
Discharge: HOME | End: 2024-05-13
Attending: NURSE PRACTITIONER

## 2024-05-13 VITALS
SYSTOLIC BLOOD PRESSURE: 122 MMHG | BODY MASS INDEX: 38.58 KG/M2 | RESPIRATION RATE: 16 BRPM | HEIGHT: 64 IN | DIASTOLIC BLOOD PRESSURE: 81 MMHG | WEIGHT: 226 LBS | HEART RATE: 70 BPM

## 2024-05-13 DIAGNOSIS — M25.512 ACUTE PAIN OF LEFT SHOULDER: Primary | ICD-10-CM

## 2024-05-13 DIAGNOSIS — I10 ESSENTIAL HYPERTENSION: ICD-10-CM

## 2024-05-13 DIAGNOSIS — M25.512 ACUTE PAIN OF LEFT SHOULDER: ICD-10-CM

## 2024-05-13 PROCEDURE — 1160F RVW MEDS BY RX/DR IN RCRD: CPT | Performed by: NURSE PRACTITIONER

## 2024-05-13 PROCEDURE — 1125F AMNT PAIN NOTED PAIN PRSNT: CPT | Performed by: NURSE PRACTITIONER

## 2024-05-13 PROCEDURE — 3008F BODY MASS INDEX DOCD: CPT | Performed by: NURSE PRACTITIONER

## 2024-05-13 PROCEDURE — 99213 OFFICE O/P EST LOW 20 MIN: CPT | Performed by: NURSE PRACTITIONER

## 2024-05-13 PROCEDURE — 3079F DIAST BP 80-89 MM HG: CPT | Performed by: NURSE PRACTITIONER

## 2024-05-13 PROCEDURE — 1159F MED LIST DOCD IN RCRD: CPT | Performed by: NURSE PRACTITIONER

## 2024-05-13 PROCEDURE — 73030 X-RAY EXAM OF SHOULDER: CPT | Performed by: NURSE PRACTITIONER

## 2024-05-13 PROCEDURE — 3074F SYST BP LT 130 MM HG: CPT | Performed by: NURSE PRACTITIONER

## 2024-05-13 PROCEDURE — 1170F FXNL STATUS ASSESSED: CPT | Performed by: NURSE PRACTITIONER

## 2024-05-13 RX ORDER — MELOXICAM 7.5 MG/1
7.5 TABLET ORAL DAILY
Qty: 30 TABLET | Refills: 0 | Status: SHIPPED | OUTPATIENT
Start: 2024-05-13

## 2024-05-13 NOTE — PROGRESS NOTES
Rohini Wolff is a 73 year old female.  Chief Complaint   Patient presents with    Shoulder Pain     Left shoulder pain x 1 month.      HPI:   She presents with left shoulder pain. She states the symptoms started about one month ago. She currently rates the pain a 5/10. She denies any trauma. She does have history of left rotator cuff repair. She takes tylenol as needed with some little relief. She has limited ROM.       Current Outpatient Medications   Medication Sig Dispense Refill    Meloxicam 7.5 MG Oral Tab Take 1 tablet (7.5 mg total) by mouth daily. 30 tablet 0    amLODIPine 10 MG Oral Tab Take 1 tablet (10 mg total) by mouth every morning. 90 tablet 3    Valsartan-hydroCHLOROthiazide 320-25 MG Oral Tab Take 1 tablet by mouth every morning. 90 tablet 3    acetaminophen 325 MG Oral Tab Take 2 tablets (650 mg total) by mouth 2 (two) times daily as needed for Pain.      ASPIRIN 81 OR Take by mouth As Directed.        Past Medical History:    Back problem    low back pain    H/O: hysterectomy    OVARIES LEFT IN PER. NG    Hearing impairment    bilateral hearing aids    High blood pressure    Osteoarthritis    bilateral knees and bilateral shoulders    Other ill-defined conditions(799.89)    ADENOSINE-THALLIUM: QUESTION OF FIXED DEFECT AT [facility]. PER. NG    Sleep apnea    had CPAP but has not used in several years    Visual impairment    glasses    Wears dentures    upper and lower      Past Surgical History:   Procedure Laterality Date    Arthroscopy of joint unlisted      R knee 20 yrs ago    Cardiac catherterization (pbp)  2001    Correct bunion,othr methods Bilateral     Hysterectomy      partial-uterus only    Repair of hammertoe,one Left     Repair rotator cuff,acute Left 2016    Tonsillectomy        Social History:  Social History     Socioeconomic History    Marital status:    Tobacco Use    Smoking status: Former     Current packs/day: 0.00     Average packs/day: 1 pack/day for 22.0 years (22.0  ttl pk-yrs)     Types: Cigarettes     Start date: 1963     Quit date: 1985     Years since quittin.7    Smokeless tobacco: Never   Vaping Use    Vaping status: Never Used   Substance and Sexual Activity    Alcohol use: No    Drug use: No    Sexual activity: Not Currently     Partners: Male      Family History   Problem Relation Age of Onset    Cancer Mother         LARYNX    Diabetes Father     Cancer Father         LARYNX    Breast Cancer Brother 70    Diabetes Brother     Cancer Brother     Heart Disorder Brother     Diabetes Brother     Cancer Brother         LEUKEMIA    Lipids Brother     Diabetes Paternal Grandmother     Diabetes Other       Allergies   Allergen Reactions    Estrogens BLEEDING     Other reaction(s): ESTROGENS, CONJUGATED  headaches      Metoprolol Succinate [Metoprolol] OTHER (SEE COMMENTS)     Other reaction(s): Bradycardia    Benazepril FACE FLUSHING     Other reaction(s): Flushing    Estrogens, Conjugated BLEEDING     Other reaction(s): ESTROGENS, CONJUGATED  headaches    Penicillin G HIVES     Other reaction(s): PENICILLIN G POTASSIUM        REVIEW OF SYSTEMS:     Review of Systems   Constitutional:  Negative for fever.   HENT: Negative.     Respiratory:  Negative for cough, shortness of breath and wheezing.    Cardiovascular:  Negative for chest pain.   Gastrointestinal: Negative.    Genitourinary: Negative.    Musculoskeletal:  Positive for arthralgias (left shoulder) and gait problem (cane).   Skin: Negative.    Psychiatric/Behavioral: Negative.        Wt Readings from Last 5 Encounters:   24 226 lb (102.5 kg)   10/20/23 215 lb (97.5 kg)   10/21/22 197 lb (89.4 kg)   22 204 lb 6.4 oz (92.7 kg)   10/04/21 204 lb 12.8 oz (92.9 kg)     Body mass index is 38.79 kg/m².      EXAM:   /81   Pulse 70   Resp 16   Ht 5' 4\" (1.626 m)   Wt 226 lb (102.5 kg)   BMI 38.79 kg/m²     Physical Exam  Vitals reviewed.   Constitutional:       Appearance: Normal  appearance.   HENT:      Head: Normocephalic.   Cardiovascular:      Rate and Rhythm: Normal rate and regular rhythm.      Pulses: Normal pulses.   Pulmonary:      Breath sounds: Normal breath sounds. No wheezing.   Abdominal:      General: Bowel sounds are normal.      Palpations: Abdomen is soft.   Musculoskeletal:         General: No swelling.      Left shoulder: Swelling and tenderness present. Decreased range of motion.   Skin:     General: Skin is warm and dry.   Neurological:      Mental Status: She is alert and oriented to person, place, and time.   Psychiatric:         Mood and Affect: Mood normal.         Behavior: Behavior normal.            ASSESSMENT AND PLAN:   1. Acute pain of left shoulder  - obtain imaging  - start on meloxicam for pain relief   - follow up with ortho if symptoms persist   - Ortho Referral - [place] ([facility])  - XR SHOULDER, COMPLETE (MIN 2 VIEWS), LEFT (CPT=73030); Future  - Meloxicam 7.5 MG Oral Tab; Take 1 tablet (7.5 mg total) by mouth daily.  Dispense: 30 tablet; Refill: 0    2. Essential hypertension  - CPM  - BP stable in office      The patient indicates understanding of these issues and agrees to the plan.  Return for if symptoms do not resolve.

## 2024-06-26 ENCOUNTER — OFFICE VISIT (OUTPATIENT)
Dept: ORTHOPEDICS CLINIC | Facility: CLINIC | Age: 74
End: 2024-06-26

## 2024-06-26 VITALS
HEIGHT: 64 IN | SYSTOLIC BLOOD PRESSURE: 138 MMHG | HEART RATE: 61 BPM | DIASTOLIC BLOOD PRESSURE: 78 MMHG | WEIGHT: 228 LBS | BODY MASS INDEX: 38.93 KG/M2

## 2024-06-26 DIAGNOSIS — M67.912 TENDINOPATHY OF LEFT ROTATOR CUFF: Primary | ICD-10-CM

## 2024-06-26 PROCEDURE — 1160F RVW MEDS BY RX/DR IN RCRD: CPT | Performed by: ORTHOPAEDIC SURGERY

## 2024-06-26 PROCEDURE — 1125F AMNT PAIN NOTED PAIN PRSNT: CPT | Performed by: ORTHOPAEDIC SURGERY

## 2024-06-26 PROCEDURE — 99204 OFFICE O/P NEW MOD 45 MIN: CPT | Performed by: ORTHOPAEDIC SURGERY

## 2024-06-26 PROCEDURE — 1159F MED LIST DOCD IN RCRD: CPT | Performed by: ORTHOPAEDIC SURGERY

## 2024-06-26 PROCEDURE — 3075F SYST BP GE 130 - 139MM HG: CPT | Performed by: ORTHOPAEDIC SURGERY

## 2024-06-26 PROCEDURE — 3008F BODY MASS INDEX DOCD: CPT | Performed by: ORTHOPAEDIC SURGERY

## 2024-06-26 PROCEDURE — 20610 DRAIN/INJ JOINT/BURSA W/O US: CPT | Performed by: ORTHOPAEDIC SURGERY

## 2024-06-26 PROCEDURE — 3078F DIAST BP <80 MM HG: CPT | Performed by: ORTHOPAEDIC SURGERY

## 2024-06-26 RX ORDER — TRIAMCINOLONE ACETONIDE 40 MG/ML
40 INJECTION, SUSPENSION INTRA-ARTICULAR; INTRAMUSCULAR ONCE
Status: COMPLETED | OUTPATIENT
Start: 2024-06-26 | End: 2024-06-26

## 2024-06-26 RX ADMIN — TRIAMCINOLONE ACETONIDE 40 MG: 40 INJECTION, SUSPENSION INTRA-ARTICULAR; INTRAMUSCULAR at 17:03:00

## 2024-06-26 NOTE — PROGRESS NOTES
Per verbal order from Dr. Fitzpatrick draw up 3ml of 0.5% Marcaine & 2ml 1% lidocaine and 1ml of Kenalog 40 for cortisone injection to left shoulder. Theresa WONG MA    Patient provided education handout for cortisone injection.

## 2024-06-26 NOTE — PROGRESS NOTES
NURSING INTAKE COMMENTS:   Chief Complaint   Patient presents with    Shoulder Pain     Left - onset several months ago - no injury - had a RCR done 6-8 years ago - has pain in the anterior of the shoulder with radiation down her arm and up to her neck rated as 2-8/10 at all the time - no numbness or tingling - pt is R handed - has x-rays in the system        HPI: This 73 year old female presents today with complaints of left shoulder pain.  She had progressive pain for a number of months.  No history of injury.  The pain is mainly over the lateral shoulder and arm especially with lifting the arm and internally rotated shoulder.  She had a rotator cuff repair on this side back in 10 years ago.  She had no persistent pain after the surgery.  No recent injury.  She does have occasional night pain.  She has overhead pain.  She reports some clicking in the shoulder.  She is taking only Tylenol for pain.  She does have some pain at rest.  The pain radiates towards the neck.  She has no neck pain otherwise.    Past Medical History:    Back problem    low back pain    H/O: hysterectomy    OVARIES LEFT IN PER. NG    Hearing impairment    bilateral hearing aids    High blood pressure    Osteoarthritis    bilateral knees and bilateral shoulders    Other ill-defined conditions(799.89)    ADENOSINE-THALLIUM: QUESTION OF FIXED DEFECT AT [facility]. PER. NG    Sleep apnea    had CPAP but has not used in several years    Visual impairment    glasses    Wears dentures    upper and lower     Past Surgical History:   Procedure Laterality Date    Arthroscopy of joint unlisted      R knee 20 yrs ago    Cardiac catherterization (pbp)  2001    Correct bunion,othr methods Bilateral     Hysterectomy      partial-uterus only    Repair of hammertoe,one Left     Repair rotator cuff,acute Left 2016    Tonsillectomy       Current Outpatient Medications   Medication Sig Dispense Refill    Meloxicam 7.5 MG Oral Tab Take 1 tablet (7.5 mg total) by mouth  daily. 30 tablet 0    amLODIPine 10 MG Oral Tab Take 1 tablet (10 mg total) by mouth every morning. 90 tablet 3    Valsartan-hydroCHLOROthiazide 320-25 MG Oral Tab Take 1 tablet by mouth every morning. 90 tablet 3    acetaminophen 325 MG Oral Tab Take 2 tablets (650 mg total) by mouth 2 (two) times daily as needed for Pain.      ASPIRIN 81 OR Take by mouth As Directed.       Allergies   Allergen Reactions    Estrogens BLEEDING     Other reaction(s): ESTROGENS, CONJUGATED  headaches      Metoprolol Succinate [Metoprolol] OTHER (SEE COMMENTS)     Other reaction(s): Bradycardia    Benazepril FACE FLUSHING     Other reaction(s): Flushing    Estrogens, Conjugated BLEEDING     Other reaction(s): ESTROGENS, CONJUGATED  headaches    Penicillin G HIVES     Other reaction(s): PENICILLIN G POTASSIUM     Family History   Problem Relation Age of Onset    Cancer Mother         LARYNX    Diabetes Father     Cancer Father         LARYNX    Breast Cancer Brother 70    Diabetes Brother     Cancer Brother     Heart Disorder Brother     Diabetes Brother     Cancer Brother         LEUKEMIA    Lipids Brother     Diabetes Paternal Grandmother     Diabetes Other        Social History     Occupational History    Not on file   Tobacco Use    Smoking status: Former     Current packs/day: 0.00     Average packs/day: 1 pack/day for 22.0 years (22.0 ttl pk-yrs)     Types: Cigarettes     Start date: 1963     Quit date: 1985     Years since quittin.8    Smokeless tobacco: Never   Vaping Use    Vaping status: Never Used   Substance and Sexual Activity    Alcohol use: No    Drug use: No    Sexual activity: Not Currently     Partners: Male        Review of Systems:  GENERAL: denies fevers, chills, night sweats, fatigue, unintentional weight loss/gain  SKIN: denies skin lesions, open sores, rash  HEENT:denies recent vision change, new nasal congestion,hearing loss, tinnitus, sore throat, headaches  RESPIRATORY: denies new shortness of  breath, cough, asthma, wheezing  CARDIOVASCULAR: denies chest pain, leg cramps with exertion, palpitations, leg swelling  GI: denies abdominal pain, nausea, vomiting, diarrhea, constipation, hematochezia, worsening heartburn or stomach ulcers  : denies dysuria, hematuria, incontinence, increased frequency, urgency, difficulty urinating  MUSCULOSKELETAL: denies musculoskeletal complaints other than in HPI  NEURO: denies numbness, tingling, weakness, balance issues, dizziness, memory loss  PSYCHIATRIC: denies Hx of depression, anxiety, other psychiatric disorders  HEMATOLOGIC: denies blood clots, anemia, blood clotting disorders, blood transfusion  ENDOCRINE: denies autoimmune disease, thyroid issues, or diabetes  ALLERGY: denies asthma, seasonal allergies    Physical Examination:    Ht 5' 4\" (1.626 m)   Wt 228 lb (103.4 kg)   BMI 39.14 kg/m²   Constitutional: appears well hydrated, alert and responsive, no acute distress noted  Extremities: Cervical spine range of motion restricted in extension lateral bending.  Spurling sign negative.    Semination of left shoulder reveals no obvious atrophy no prominence.  Active forward flexion is to 150 degrees with pain.  Active external rotation 60 degrees with pain.  Passive internal rotation 60 degrees with pain.  Abduction strength 4+ out of 5.  External rotation strength 4-5.  Belly press 5-5.  Sumner impingement sign positive.  Speed's Test mildly positive.  Crank sign positive.  Neurological: Left hand light touch and pinprick sensory exam normal. Lateral shoulder light touch and pinprick sensory examination normal.  strength,wrist extension, biceps, triceps, and shoulder abduction strength 5 out of 5. Kourtney sign negative. No obvious atrophy of the hand.        Imaging:   X-rays left shoulder show mild glenohumeral degenerative changes.  Mild glenohumeral degenerative changes AC joint are also noted.  There are some calcification along the greater tuberosity  but no fracture noted    Labs:  Lab Results   Component Value Date    WBC 6.1 10/20/2023    HGB 14.2 10/20/2023    .0 10/20/2023      Lab Results   Component Value Date    GLU 90 10/20/2023    BUN 18 10/20/2023    CREATSERUM 0.79 10/20/2023    GFRNAA 89 10/04/2021    GFRAA 102 10/04/2021        Assessment and Plan:  Diagnoses and all orders for this visit:    Tendinopathy of left rotator cuff  -     PHYSICAL THERAPY - INTERNAL  -     arthrocentesis major joint  -     triamcinolone acetonide (Kenalog-40) 40 MG/ML injection 40 mg        Assessment: Left shoulder rotator cuff tendinopathy, possible recurrent degenerative tear    Plan: I recommend nonoperative treatment of her shoulder.  Suggested icing and oral anti-inflammatories.  Provided referral for outpatient physical therapy.  Subacromial space was injected with 40 mg of Kenalog using a lateral approach.  Discussed activity modifications.  Follow-up again in 6 weeks as needed.  Consider an MRI if symptoms continue or worsen.    Follow Up: Return in about 6 weeks (around 8/7/2024).    DEVON SHAW MD

## 2024-07-19 ENCOUNTER — TELEPHONE (OUTPATIENT)
Dept: PHYSICAL THERAPY | Facility: HOSPITAL | Age: 74
End: 2024-07-19

## 2024-07-22 NOTE — PROGRESS NOTES
SHOULDER EVALUATION:     Diagnosis:   Tendinopathy of left rotator cuff (M67.912)       Referring Provider: Shay Fitzpatrick MD Date of Evaluation:    7/23/2024    Precautions:  Fall Risk Next MD visit:   none scheduled  Date of Surgery: n/a     PATIENT SUMMARY   Rohini Wolff is a 73 year old female who presents to therapy today with complaints of left shoulder pain of an insidious onset that started a few months ago. Pt reports no LISSA or falls that may have contributed to her pain. She reports that she underwent a L RTC repair 7-10 years ago. No reports of tingling and numbness; however, stated that she did have a hx of a cervical compression fracture that occurred many years prior. Her shoulder pain is a dull ache in nature and occasional sharp pain that is not necessarily due to a certain shoulder movement she performs. Her pain encompasses her entire shoulder and upper trapezius. Pt was given a cortisone injection by orthopedic 6/26/24.   Pt describes pain level current 2/10, at best 2/10, at worst 7/10.   Current functional limitations include hanging her purse over her L shoulder, sleeping on the L side, placing an object in her back pocket, lifting a laundry basket, lifting overhead as well as reaching behind her to get an object from the back seat of her car.    Rohini describes prior level of function as good. Pt goals include hanging her purse over her L shoulder, sleeping on the L side, placing an object in her back pocket, lifting a laundry basket, lifting overhead, reaching behind her to get an object from the back seat of her car as well as increasing range of motion and strength and decreasing pain.  Past medical history was reviewed with Rohini. Significant findings include HTN, hx of L RTC repair, hx R TKA, LE neuropathy per pt.     ASSESSMENT  Rohini presents to physical therapy evaluation with primary c/o L shoulder pain. The results of the objective tests and measures show decreased  L shoulder ROM, UE/scapular strength, UE muscle length and joint mobility restrictions, abnormal posture.  Functional deficits include but are not limited to hanging her purse over her L shoulder, sleeping on the L side, placing an object in her back pocket, lifting a laundry basket, lifting overhead as well as reaching behind her to get an object from the back seat of her car. Signs and symptoms are consistent with diagnosis of Tendinopathy of left rotator cuff (M67.912). Pt and PT discussed evaluation findings, pathology, POC and HEP.  Pt voiced understanding and performs HEP correctly without reported pain. Skilled Physical Therapy is medically necessary to address the above impairments and reach functional goals.     OBJECTIVE:   Observation/Posture: FHP with rounded shoulders  Palpation: TTP: L Supraspinatus tendon, muscle belly, Upper trapezius, LHB tendon, Infraspinatus Tendon  Sensation: BUE WNL  Cervical Screen: WNL  AROM: (* denotes performed with pain)  Shoulder    Flexion: R 150/180; L 130/180  Extension: R 60/60; L 55*/60  Abduction: R 142/180; L 92*/180  ER: R T3; L C1*  IR: R T8; L S1     Accessory motion: L  GHJ inferolateral glide: mild hypomobility  GHJ anterior glide: mild hypomobility  GHJ posterior glide: mild hypomobility    Flexibility: UE/Shoulder   R L   Upper trap moderately restricted moderately restricted   Shoulder ER minimally restricted moderately restricted   Shoulder IR moderately restricted moderately restricted   Pec Major moderately restricted moderately restricted   Pec Minor moderately restricted moderately restricted   Lats minimally restricted moderately restricted       Strength/MMT: (* denotes performed with pain)  Shoulder Elbow Scapular   Flexion: R 4/5; L 3+*/5  Abduction: R 4/5; L 4-/5  ER: R 4-/5; L 3+*/5  IR: R 4-/5; L 4/5 Flexion: R 4/5; L 4/5  Extension: R 4/5; L 4/5 Rhomboids: R 3+/5, L 3+/5  Mid trap: R 3+/5; L 3+/5   Lats: R 3+/5, L 3+/5  Low trap: R 3/5; L 3/5      Special tests: ISMAEL Melgoza's Adolfo Test: (+)  Empty Can Test: (+)  Speed's Test: (+)    Today’s Treatment and Response:   Pt education was provided on exam findings, treatment diagnosis, treatment plan, expectations, and prognosis. Pt was also provided recommendations for possible soreness after evaluation, modalities as needed [ice/heat], and postural corrections, performing exercises in pain-free range  Patient was instructed in and issued a HEP for: Access Code: [email or web address]  URL: [email or web address]  Date: 07/23/2024  Prepared by: Yandy Ramirez    Exercises  - Seated Shoulder Flexion AAROM with Dowel  - 1 x daily - 7 x weekly - 2 sets - 10 reps  - Standing Shoulder Internal Rotation Stretch with Towel  - 1 x daily - 7 x weekly - 10 reps - 3-5 second hold  - Standing Row with Anchored Resistance  - 1 x daily - 7 x weekly - 3 sets - 10 reps    *All questions/comments/concerns regarding any type of information on surgeries/surgical procedures have been directed to Dr. Fitzpatrick per referring physician's request    Charges: PT Eval Low Complexity      Total Timed Treatment: 35 min     Total Treatment Time: 35 min   Therex: 0 minutes  Theract: 15 minutes  NMR: 0 minutes  Manual Therapy: 0 minutes    Based on clinical rationale and outcome measures, this evaluation involved Low Complexity decision making due to 1-2 personal factors/comorbidities,  body structures involved/activity limitations, and unstable symptoms including changing pain levels.  PLAN OF CARE:    Goals: (to be met in 8-12 visits)   Pt will report improved ability to sleep without waking due to shoulder pain   Pt will improve shoulder flexion AROM to >150+ degrees to be able to reach into overhead cabinets without pain or restriction   Pt will improve shoulder abduction AROM to >140+ degrees to improve ability to don deodorant, don/doff shirts, and wash hair   Pt will increase shoulder AROM ER to T3 to be able to reach and fasten  seatbelt   Pt will increase shoulder AROM IR to L4 to be able to reach in back pocket, tuck in shirt, and turn steering wheel without pain  Pt will improve shoulder strength throughout to 4/5 to improve function with lifting laundry basket   Pt will demonstrate increased mid/low trap strength to 4/5 to promote improved shoulder mechanics and stabilization with lifting and reaching   Pt will be independent and compliant with comprehensive HEP to maintain progress achieved in PT       Frequency / Duration: Patient will be seen for 2 x/week or a total of 8-12 visits over a 90 day period. Treatment will include: Manual Therapy, Neuromuscular Re-education, Self-Care Home Management, Therapeutic Activities, Therapeutic Exercise, and Home Exercise Program instruction    Education or treatment limitation: None  Rehab Potential:good    [employer] Q1-11    Question 7/17/2024 10:00 AM CDT - Filed by Patient   Open a tight or new jar Mild difficulty   Do heavy household chores (eg, wash walls, floors) Moderate difficulty   Carry a shopping bag or briefcase Mild difficulty   Wash your back Mild difficulty   Use a knife to cut food Mild difficulty   Recreational activities in which you take some force or impact through your arm, shoulder or hand (eg, golf, hammering, tennis, etc) No difficulty   During the past week, to what extent has your arm, shoulder or hand problem interfered with your normal social activities with family, friends, neighbors or groups? Not at all   During the past week, were you limited in your work or other regular daily activities as a result of your arm, shoulder or hand problem? Slightly limited   Arm, shoulder or hand pain Mild   Tingling (pins and needles) in your arm, shoulder or hand None   During the past week, how much difficulty have you had sleeping because of the pain in your arm, shoulder or hand? Mild difficulty   Score (range: 0 - 100) 20.45     Patient/Family/Caregiver was advised of these  findings, precautions, and treatment options and has agreed to actively participate in planning and for this course of care.    Thank you for your referral. Please co-sign or sign and return this letter via fax as soon as possible to [phone]. If you have any questions, please contact me at Dept: [phone]    Sincerely,  Electronically signed by therapist: Yandy Ramirez PT  Physician's certification required: Yes  I certify the need for these services furnished under this plan of treatment and while under my care.    X___________________________________________________ Date____________________    Certification From: 7/22/2024  To:10/20/2024

## 2024-07-23 ENCOUNTER — OFFICE VISIT (OUTPATIENT)
Dept: PHYSICAL THERAPY | Age: 74
End: 2024-07-23
Attending: ORTHOPAEDIC SURGERY
Payer: MEDICARE

## 2024-07-23 DIAGNOSIS — M67.912 TENDINOPATHY OF LEFT SHOULDER: Primary | ICD-10-CM

## 2024-07-23 PROCEDURE — 97161 PT EVAL LOW COMPLEX 20 MIN: CPT | Performed by: PHYSICAL THERAPIST

## 2024-07-23 PROCEDURE — 97530 THERAPEUTIC ACTIVITIES: CPT | Performed by: PHYSICAL THERAPIST

## 2024-07-25 ENCOUNTER — OFFICE VISIT (OUTPATIENT)
Dept: PHYSICAL THERAPY | Age: 74
End: 2024-07-25
Attending: ORTHOPAEDIC SURGERY
Payer: MEDICARE

## 2024-07-25 DIAGNOSIS — M67.912 TENDINOPATHY OF LEFT SHOULDER: Primary | ICD-10-CM

## 2024-07-25 PROCEDURE — 97110 THERAPEUTIC EXERCISES: CPT | Performed by: PHYSICAL THERAPIST

## 2024-07-25 PROCEDURE — 97112 NEUROMUSCULAR REEDUCATION: CPT | Performed by: PHYSICAL THERAPIST

## 2024-07-25 PROCEDURE — 97530 THERAPEUTIC ACTIVITIES: CPT | Performed by: PHYSICAL THERAPIST

## 2024-07-25 NOTE — PROGRESS NOTES
Diagnosis:   Tendinopathy of left rotator cuff (M67.912)       Referring Provider: Shay Fitzpatrick MD Date of Evaluation:    7/23/2024    Precautions:  Fall Risk    Per MD: RTC strengthening, capsular stretching  Next MD visit:   none scheduled  Date of Surgery: n/a   Insurance Primary/Secondary: [employer] MCR / N/A     # Auth Visits: (#2/8; 7/23/2024-10/23/2024)            Subjective: Pt reports \"I have minimal pain today, the pain in the shoulder was a little sporadic and 'jabbing' yesterday most likely due to the dampness.\"    Pain: 2/10      Objective: See table below.      Assessment: Pt responded well to therapeutic interventions. Initiated shoulder PROM exercises to help with increasing shoulder motion and capsular stretching. Isometrics performed to help with RTC strength. Pt instructed on icing the shoulder after exercises and to skip HEP today and tomorrow if shoulder is too sore.       Goals:   (to be met in 8-12 visits)   Pt will report improved ability to sleep without waking due to shoulder pain   Pt will improve shoulder flexion AROM to >150+ degrees to be able to reach into overhead cabinets without pain or restriction   Pt will improve shoulder abduction AROM to >140+ degrees to improve ability to don deodorant, don/doff shirts, and wash hair   Pt will increase shoulder AROM ER to T3 to be able to reach and fasten seatbelt   Pt will increase shoulder AROM IR to L4 to be able to reach in back pocket, tuck in shirt, and turn steering wheel without pain  Pt will improve shoulder strength throughout to 4/5 to improve function with lifting laundry basket   Pt will demonstrate increased mid/low trap strength to 4/5 to promote improved shoulder mechanics and stabilization with lifting and reaching   Pt will be independent and compliant with comprehensive HEP to maintain progress achieved in PT     Plan: Progress skilled PT as tolerated to help with RTC strength and capsular stretching.  Date:  7/25/2024  TX#: 2/8-12 Date:                 TX#: 3/ Date:                 TX#: 4/ Date:                 TX#: 5/ Date:   Tx#: 6/   Therex: x15'  UBE: x2' fwd L1  PROM: L shoulder in flexion, abduction, ER, IR in pain-free range  AAROM Shoulder Flexion: 1x15 seated  AAROM Shoulder ER: 1x15 seated  Pec Stretch: 2x30\" doorway - hands down  Wall Slides: 1x15 B  Shoulder IR Stretch: 5x5\" hold with DKSA strap       Theract: x15'  SA Punch: 2x10 supine with 0#  S/L Shoulder ER: 1x15 L (small ROM)  Rows: 1x15 with RTT  Shoulder IR isometric walkouts: 1x10 YTT L  Shoulder ER isometric walkouts: 1x10 1 YTT L       NMR: x10'  Seated Scapular Squeezes: 1x15 with 2\" hold  Seated Shoulder Shrugs: 1x15 with 2\" hold              HEP:   Exercises  - Seated Shoulder Flexion AAROM with Dowel  - 1 x daily - 7 x weekly - 2 sets - 10 reps  - Standing Shoulder Internal Rotation Stretch with Towel  - 1 x daily - 7 x weekly - 10 reps - 3-5 second hold  - Standing Row with Anchored Resistance  - 1 x daily - 7 x weekly - 3 sets - 10 reps    Charges: Therex: 15 minutes  Theract: 15 minutes  NMR: 10 minutes  Manual Therapy: 0 minutes  Total Timed Treatment: 40 min  Total Treatment Time: 40 min

## 2024-07-30 ENCOUNTER — OFFICE VISIT (OUTPATIENT)
Dept: PHYSICAL THERAPY | Age: 74
End: 2024-07-30
Attending: ORTHOPAEDIC SURGERY
Payer: MEDICARE

## 2024-07-30 DIAGNOSIS — M67.912 TENDINOPATHY OF LEFT SHOULDER: Primary | ICD-10-CM

## 2024-07-30 PROCEDURE — 97112 NEUROMUSCULAR REEDUCATION: CPT | Performed by: PHYSICAL THERAPIST

## 2024-07-30 PROCEDURE — 97530 THERAPEUTIC ACTIVITIES: CPT | Performed by: PHYSICAL THERAPIST

## 2024-07-30 PROCEDURE — 97110 THERAPEUTIC EXERCISES: CPT | Performed by: PHYSICAL THERAPIST

## 2024-07-30 NOTE — PROGRESS NOTES
Diagnosis:   Tendinopathy of left rotator cuff (M67.912)       Referring Provider: Shay Fitzpatrick MD Date of Evaluation:    7/23/2024    Precautions:  Fall Risk    Per MD: RTC strengthening, capsular stretching  Next MD visit:   none scheduled  Date of Surgery: n/a   Insurance Primary/Secondary: HU[employer]NA MCR / N/A     # Auth Visits: (#3/8; 7/23/2024-10/23/2024)            Subjective: Pt reports \"the shoulder felt a little sore after last session, the weather has a lot to do with my shoulder pain, I feel like the cortisone injection is kicking in.\"    Pain: 4/10      Objective: See table below.      Assessment: Pt responded well to therapeutic interventions. Added CCW wall circles today to help with driving and steering, pt had some discomfort with the exercise so clockwise motion was not performed. Pt instructed on making sure all exercises are pain-free.      Goals:   (to be met in 8-12 visits)   Pt will report improved ability to sleep without waking due to shoulder pain   Pt will improve shoulder flexion AROM to >150+ degrees to be able to reach into overhead cabinets without pain or restriction   Pt will improve shoulder abduction AROM to >140+ degrees to improve ability to don deodorant, don/doff shirts, and wash hair   Pt will increase shoulder AROM ER to T3 to be able to reach and fasten seatbelt   Pt will increase shoulder AROM IR to L4 to be able to reach in back pocket, tuck in shirt, and turn steering wheel without pain  Pt will improve shoulder strength throughout to 4/5 to improve function with lifting laundry basket   Pt will demonstrate increased mid/low trap strength to 4/5 to promote improved shoulder mechanics and stabilization with lifting and reaching   Pt will be independent and compliant with comprehensive HEP to maintain progress achieved in PT     Plan: Progress skilled PT as tolerated to help with RTC strength and capsular stretching.  Date: 7/25/2024  TX#: 2/8-12 Date: 7/30/2024                 TX#: 3/8-12 Date:                 TX#: 4/ Date:                 TX#: 5/ Date:   Tx#: 6/   Therex: x15'  UBE: x2' fwd L1  PROM: L shoulder in flexion, abduction, ER, IR in pain-free range  AAROM Shoulder Flexion: 1x15 seated  AAROM Shoulder ER: 1x15 seated  Pec Stretch: 2x30\" doorway - hands down  Wall Slides: 1x15 B  Shoulder IR Stretch: 5x5\" hold with DKSA strap Therex: x15'  NuStep: x5' L1  PROM: L shoulder in flexion, abduction, ER, IR in pain-free range  AAROM Shoulder Flexion: 1x15 seated  AAROM Shoulder ER: 1x15 seated  Pec Stretch: 2x30\" doorway - hands down  Wall Slides: 1x15 B  Wall Circles: 1x15 CCW  Shoulder IR Stretch: 5x5\" hold with DKSA strap  Pulleys: 20x flexion      Theract: x15'  SA Punch: 2x10 supine with 0#  S/L Shoulder ER: 1x15 L (small ROM)  Rows: 1x15 with RTT  Shoulder IR isometric walkouts: 1x10 YTT L  Shoulder ER isometric walkouts: 1x10 1 YTT L Theract: x15'  SA Punch: 2x10 supine with 0#  S/L Shoulder ER: 1x15 L (small ROM)  Rows: 1x15 with RTT  Shoulder IR isometric walkouts: 1x10 YTT L  Shoulder ER isometric walkouts: 1x10 1 YTT L      NMR: x10'  Seated Scapular Squeezes: 1x15 with 2\" hold  Seated Shoulder Shrugs: 1x15 with 2\" hold NMR: x10'  Seated Scapular Squeezes: 1x15 with 2\" hold  Seated Shoulder Shrugs: 1x15 with 2\" hold             HEP:   Exercises  - Seated Shoulder Flexion AAROM with Dowel  - 1 x daily - 7 x weekly - 2 sets - 10 reps  - Standing Shoulder Internal Rotation Stretch with Towel  - 1 x daily - 7 x weekly - 10 reps - 3-5 second hold  - Standing Row with Anchored Resistance  - 1 x daily - 7 x weekly - 3 sets - 10 reps    Charges: Therex: 15 minutes  Theract: 15 minutes  NMR: 10 minutes  Manual Therapy: 0 minutes  Total Timed Treatment: 40 min  Total Treatment Time: 40 min

## 2024-07-30 NOTE — PROGRESS NOTES
Diagnosis:   Tendinopathy of left rotator cuff (M67.912)       Referring Provider: Shay Fitzpatrick MD Date of Evaluation:    7/23/2024    Precautions:  Fall Risk    Per MD: RTC strengthening, capsular stretching  Next MD visit:   none scheduled  Date of Surgery: n/a   Insurance Primary/Secondary: HU[employer] MCR / N/A     # Auth Visits: (#3/8; 7/23/2024-10/23/2024)            Subjective: Pt reports \"I have minimal pain today, the pain in the shoulder was a little sporadic and 'jabbing' yesterday most likely due to the dampness.\"    Pain: 2/10      Objective: See table below.      Assessment: Pt responded well to therapeutic interventions. Initiated shoulder PROM exercises to help with increasing shoulder motion and capsular stretching. Isometrics performed to help with RTC strength. Pt instructed on icing the shoulder after exercises and to skip HEP today and tomorrow if shoulder is too sore.       Goals:   (to be met in 8-12 visits)   Pt will report improved ability to sleep without waking due to shoulder pain   Pt will improve shoulder flexion AROM to >150+ degrees to be able to reach into overhead cabinets without pain or restriction   Pt will improve shoulder abduction AROM to >140+ degrees to improve ability to don deodorant, don/doff shirts, and wash hair   Pt will increase shoulder AROM ER to T3 to be able to reach and fasten seatbelt   Pt will increase shoulder AROM IR to L4 to be able to reach in back pocket, tuck in shirt, and turn steering wheel without pain  Pt will improve shoulder strength throughout to 4/5 to improve function with lifting laundry basket   Pt will demonstrate increased mid/low trap strength to 4/5 to promote improved shoulder mechanics and stabilization with lifting and reaching   Pt will be independent and compliant with comprehensive HEP to maintain progress achieved in PT     Plan: Progress skilled PT as tolerated to help with RTC strength and capsular stretching.  Date:  7/25/2024  TX#: 2/8-12 Date: 7/30/2024                TX#: 3/8-12 Date:                 TX#: 4/ Date:                 TX#: 5/ Date:   Tx#: 6/   Therex: x15'  UBE: x2' fwd L1  PROM: L shoulder in flexion, abduction, ER, IR in pain-free range  AAROM Shoulder Flexion: 1x15 seated  AAROM Shoulder ER: 1x15 seated  Pec Stretch: 2x30\" doorway - hands down  Wall Slides: 1x15 B  Shoulder IR Stretch: 5x5\" hold with DKSA strap Therex: x15'  UBE: x2' fwd L1  PROM: L shoulder in flexion, abduction, ER, IR in pain-free range  AAROM Shoulder Flexion: 1x15 seated  AAROM Shoulder ER: 1x15 seated  Pec Stretch: 2x30\" doorway - hands down  Wall Slides: 1x15 B  Shoulder IR Stretch: 5x5\" hold with DKSA strap      Theract: x15'  SA Punch: 2x10 supine with 0#  S/L Shoulder ER: 1x15 L (small ROM)  Rows: 1x15 with RTT  Shoulder IR isometric walkouts: 1x10 YTT L  Shoulder ER isometric walkouts: 1x10 1 YTT L Theract: x15'  SA Punch: 2x10 supine with 0#  S/L Shoulder ER: 1x15 L (small ROM)  Rows: 1x15 with RTT  Shoulder IR isometric walkouts: 1x10 YTT L  Shoulder ER isometric walkouts: 1x10 1 YTT L      NMR: x10'  Seated Scapular Squeezes: 1x15 with 2\" hold  Seated Shoulder Shrugs: 1x15 with 2\" hold NMR: x10'  Seated Scapular Squeezes: 1x15 with 2\" hold  Seated Shoulder Shrugs: 1x15 with 2\" hold             HEP:   Exercises  - Seated Shoulder Flexion AAROM with Dowel  - 1 x daily - 7 x weekly - 2 sets - 10 reps  - Standing Shoulder Internal Rotation Stretch with Towel  - 1 x daily - 7 x weekly - 10 reps - 3-5 second hold  - Standing Row with Anchored Resistance  - 1 x daily - 7 x weekly - 3 sets - 10 reps    Charges: Therex: 15 minutes  Theract: 15 minutes  NMR: 10 minutes  Manual Therapy: 0 minutes  Total Timed Treatment: 40 min  Total Treatment Time: 40 min

## 2024-07-31 NOTE — PROGRESS NOTES
Diagnosis:   Tendinopathy of left rotator cuff (M67.912)       Referring Provider: Shay Fitzpatrick MD Date of Evaluation:    7/23/2024    Precautions:  Fall Risk    Per MD: RTC strengthening, capsular stretching  Next MD visit:   none scheduled  Date of Surgery: n/a   Insurance Primary/Secondary: HUMANA [facility] / N/A     # Auth Visits: (#4/8; 7/23/2024-10/23/2024)            Subjective: Pt reports \"the shoulder is a little sore today because of the weather, the exercises last session were fine - just some soreness afterwards, I still have problems reaching overhead.\"    Pain: 4/10      Objective: See table below.      Assessment: Pt responded well to therapeutic interventions. S/L shoulder abduction not performed today to due increased discomfort. Attempted diagonal shoulder movements but discontinued due to supraspinatus tenderness.      Goals:   (to be met in 8-12 visits)   Pt will report improved ability to sleep without waking due to shoulder pain   Pt will improve shoulder flexion AROM to >150+ degrees to be able to reach into overhead cabinets without pain or restriction   Pt will improve shoulder abduction AROM to >140+ degrees to improve ability to don deodorant, don/doff shirts, and wash hair   Pt will increase shoulder AROM ER to T3 to be able to reach and fasten seatbelt   Pt will increase shoulder AROM IR to L4 to be able to reach in back pocket, tuck in shirt, and turn steering wheel without pain  Pt will improve shoulder strength throughout to 4/5 to improve function with lifting laundry basket   Pt will demonstrate increased mid/low trap strength to 4/5 to promote improved shoulder mechanics and stabilization with lifting and reaching   Pt will be independent and compliant with comprehensive HEP to maintain progress achieved in PT     Plan: Progress skilled PT as tolerated to help with RTC strength and capsular stretching.  Date: 7/25/2024  TX#: 2/8-12 Date: 7/30/2024                TX#: 3/8-12 Date:  8/1/2024                 TX#: 4/8-12 Date:                 TX#: 5/ Date:   Tx#: 6/   Therex: x15'  UBE: x2' fwd L1  PROM: L shoulder in flexion, abduction, ER, IR in pain-free range  AAROM Shoulder Flexion: 1x15 seated  AAROM Shoulder ER: 1x15 seated  Pec Stretch: 2x30\" doorway - hands down  Wall Slides: 1x15 B  Shoulder IR Stretch: 5x5\" hold with DKSA strap Therex: x15'  NuStep: x5' L1  PROM: L shoulder in flexion, abduction, ER, IR in pain-free range  AAROM Shoulder Flexion: 1x15 seated  AAROM Shoulder ER: 1x15 seated  Pec Stretch: 2x30\" doorway - hands down  Wall Slides: 1x15 B  Wall Circles: 1x15 CCW  Shoulder IR Stretch: 5x5\" hold with DKSA strap  Pulleys: 20x flexion Therex: x15'  NuStep: x5' L1  PROM: L shoulder in flexion, abduction, ER, IR in pain-free range  AAROM Shoulder Flexion: 1x15 seated  AAROM Shoulder ER: 1x15 seated  Pec Stretch: 2x30\" doorway - hands down  Wall Slides: 1x15 B  Wall Circles: 1x15 CCW  Shoulder IR Stretch: 5x5\" hold with DKSA strap  Pulleys: 20x flexion     Theract: x15'  SA Punch: 2x10 supine with 0#  S/L Shoulder ER: 1x15 L (small ROM)  Rows: 1x15 with RTT  Shoulder IR isometric walkouts: 1x10 YTT L  Shoulder ER isometric walkouts: 1x10 1 YTT L Theract: x15'  SA Punch: 2x10 supine with 0#  S/L Shoulder ER: 1x15 L (small ROM)  Rows: 1x15 with RTT  Shoulder IR isometric walkouts: 1x10 YTT L  Shoulder ER isometric walkouts: 1x10 1 YTT L Theract: x15'  SA Punch: 2x10 supine with 0#  S/L Shoulder ER: 1x15 L (small ROM) - not performed  Rows: 1x15 with GTB seated  Shoulder IR isometric walkouts: 1x10 YTT L  Shoulder ER isometric walkouts: 1x10 1 YTT      NMR: x10'  Seated Scapular Squeezes: 1x15 with 2\" hold  Seated Shoulder Shrugs: 1x15 with 2\" hold NMR: x10'  Seated Scapular Squeezes: 1x15 with 2\" hold  Seated Shoulder Shrugs: 1x15 with 2\" hold NMR: x10'  Seated Scapular Squeezes: 1x15 with 2\" hold  Seated Shoulder Shrugs: 1x15 with 2\" hold            HEP:   Exercises  - Seated  Shoulder Flexion AAROM with Dowel  - 1 x daily - 7 x weekly - 2 sets - 10 reps  - Standing Shoulder Internal Rotation Stretch with Towel  - 1 x daily - 7 x weekly - 10 reps - 3-5 second hold  - Standing Row with Anchored Resistance  - 1 x daily - 7 x weekly - 3 sets - 10 reps    Charges: Therex: 15 minutes  Theract: 15 minutes  NMR: 10 minutes  Manual Therapy: 0 minutes  Total Timed Treatment: 40 min  Total Treatment Time: 40 min

## 2024-08-01 ENCOUNTER — OFFICE VISIT (OUTPATIENT)
Dept: PHYSICAL THERAPY | Age: 74
End: 2024-08-01
Attending: ORTHOPAEDIC SURGERY
Payer: MEDICARE

## 2024-08-01 DIAGNOSIS — M67.912 TENDINOPATHY OF LEFT SHOULDER: Primary | ICD-10-CM

## 2024-08-01 PROCEDURE — 97110 THERAPEUTIC EXERCISES: CPT | Performed by: PHYSICAL THERAPIST

## 2024-08-01 PROCEDURE — 97530 THERAPEUTIC ACTIVITIES: CPT | Performed by: PHYSICAL THERAPIST

## 2024-08-01 PROCEDURE — 97112 NEUROMUSCULAR REEDUCATION: CPT | Performed by: PHYSICAL THERAPIST

## 2024-08-05 NOTE — PROGRESS NOTES
Diagnosis:   Tendinopathy of left rotator cuff (M67.912)       Referring Provider: Shay Fitzpatrick MD Date of Evaluation:    7/23/2024    Precautions:  Fall Risk    Per MD: RTC strengthening, capsular stretching  Next MD visit:   none scheduled  Date of Surgery: n/a   Insurance Primary/Secondary: [employer] [facility] / N/A     # Auth Visits: (#5/8; 7/23/2024-10/23/2024)            Subjective: Pt reports \"the shoulder is a little sore today because of the weather, the exercises last session were fine - just some soreness afterwards, I still have problems reaching overhead.\"    Pain: 3-4/10     Discharge Summary  Pt has attended 5 visits in Physical Therapy. Pt reports that her commute to PT is long and would like to continue with exercises at home. Pt given comprehensive HEP to help with continued strengthening and ROM in pain-free range. Pt instructed to return to referring physician if sx persist.      Objective: See table below.      Assessment: Pt responded well to therapeutic interventions with no increase in pain. Continued with capsular stretching and RTC strengthening to help with return to functional activities.       Goals:   (to be met in 8-12 visits)   Pt will report improved ability to sleep without waking due to shoulder pain   Pt will improve shoulder flexion AROM to >150+ degrees to be able to reach into overhead cabinets without pain or restriction   Pt will improve shoulder abduction AROM to >140+ degrees to improve ability to don deodorant, don/doff shirts, and wash hair   Pt will increase shoulder AROM ER to T3 to be able to reach and fasten seatbelt   Pt will increase shoulder AROM IR to L4 to be able to reach in back pocket, tuck in shirt, and turn steering wheel without pain  Pt will improve shoulder strength throughout to 4/5 to improve function with lifting laundry basket   Pt will demonstrate increased mid/low trap strength to 4/5 to promote improved shoulder mechanics and stabilization with lifting  and reaching   Pt will be independent and compliant with comprehensive HEP to maintain progress achieved in PT     Date: 7/25/2024  TX#: 2/8-12 Date: 7/30/2024                TX#: 3/8-12 Date: 8/1/2024                 TX#: 4/8-12 Date: 8/6/2024                TX#: 5/8-12 Date:   Tx#: 6/8-12   Therex: x15'  UBE: x2' fwd L1  PROM: L shoulder in flexion, abduction, ER, IR in pain-free range  AAROM Shoulder Flexion: 1x15 seated  AAROM Shoulder ER: 1x15 seated  Pec Stretch: 2x30\" doorway - hands down  Wall Slides: 1x15 B  Shoulder IR Stretch: 5x5\" hold with DKSA strap Therex: x15'  NuStep: x5' L1  PROM: L shoulder in flexion, abduction, ER, IR in pain-free range  AAROM Shoulder Flexion: 1x15 seated  AAROM Shoulder ER: 1x15 seated  Pec Stretch: 2x30\" doorway - hands down  Wall Slides: 1x15 B  Wall Circles: 1x15 CCW  Shoulder IR Stretch: 5x5\" hold with DKSA strap  Pulleys: 20x flexion Therex: x15'  NuStep: x5' L1  PROM: L shoulder in flexion, abduction, ER, IR in pain-free range  AAROM Shoulder Flexion: 1x15 seated  AAROM Shoulder ER: 1x15 seated  Pec Stretch: 2x30\" doorway - hands down  Wall Slides: 1x15 B  Wall Circles: 1x15 CCW  Shoulder IR Stretch: 5x5\" hold with DKSA strap  Pulleys: 20x flexion Therex: x15'  NuStep: x5' L1  PROM: L shoulder in flexion, abduction, ER, IR in pain-free range  AAROM Shoulder Flexion: 1x15 seated  AAROM Shoulder ER: 1x15 seated  Pec Stretch: 2x30\" doorway - hands down  Wall Slides: 1x15 B  Wall Circles: 1x15 CCW  Shoulder IR Stretch: 5x5\" hold with DKSA strap  Pulleys: 20x flexion    Theract: x15'  SA Punch: 2x10 supine with 0#  S/L Shoulder ER: 1x15 L (small ROM)  Rows: 1x15 with RTT  Shoulder IR isometric walkouts: 1x10 YTT L  Shoulder ER isometric walkouts: 1x10 1 YTT L Theract: x15'  SA Punch: 2x10 supine with 0#  S/L Shoulder ER: 1x15 L (small ROM)  Rows: 1x15 with RTT  Shoulder IR isometric walkouts: 1x10 YTT L  Shoulder ER isometric walkouts: 1x10 1 YTT L Theract: x15'  SA Punch:  2x10 supine with 0#  S/L Shoulder ER: 1x15 L (small ROM) - not performed  Rows: 1x15 with GTB seated  Shoulder IR isometric walkouts: 1x10 YTT L  Shoulder ER isometric walkouts: 1x10 1 YTT  Theract: x15'  SA Punch: 2x10 supine with 0#  S/L Shoulder ER: 1x15 L (small ROM) - not performed  Rows: 1x15 with GTB seated  Shoulder IR isometric walkouts: 1x10 YTT L  Shoulder ER isometric walkouts: 1x10 1 YTT     NMR: x10'  Seated Scapular Squeezes: 1x15 with 2\" hold  Seated Shoulder Shrugs: 1x15 with 2\" hold NMR: x10'  Seated Scapular Squeezes: 1x15 with 2\" hold  Seated Shoulder Shrugs: 1x15 with 2\" hold NMR: x10'  Seated Scapular Squeezes: 1x15 with 2\" hold  Seated Shoulder Shrugs: 1x15 with 2\" hold NMR: x10'  Seated Scapular Squeezes: 1x15 with 2\" hold  Seated Shoulder Shrugs: 1x15 with 2\" hold           HEP:   Exercises (1x15); 2-3x/week; pain-free  - Supine Single Arm Shoulder Protraction  - 1 x daily - 7 x weekly - 3 sets - 10 reps  - Seated Shoulder Flexion AAROM with Pulley Behind  - 1 x daily - 7 x weekly - 3 sets - 10 reps  - Seated Shoulder External Rotation AAROM with Cane and Hand in Neutral  - 1 x daily - 7 x weekly - 3 sets - 10 reps  - Seated Shoulder Flexion AAROM with Dowel  - 1 x daily - 7 x weekly - 3 sets - 10 reps  - Corner Pec Minor Stretch  - 1 x daily - 7 x weekly - 3 sets - 30 second hold  - Shoulder Flexion Wall Slide with Towel  - 1 x daily - 7 x weekly - 3 sets - 10 reps  - Standing Wall Ball Circles with Plyo Ball  - 1 x daily - 7 x weekly - 3 sets - 10 reps  - Seated Scapular Retraction  - 1 x daily - 7 x weekly - 3 sets - 10 reps  - Seated Shoulder Shrugs  - 1 x daily - 7 x weekly - 3 sets - 10 reps  - Standing Shoulder Internal Rotation Stretch with Towel  - 1 x daily - 7 x weekly - 3 sets - 10 reps  - Standing Row with Anchored Resistance  - 1 x daily - 7 x weekly - 3 sets - 10 reps  - Shoulder Internal Rotation Reactive Isometrics  - 1 x daily - 7 x weekly - 3 sets - 10 reps  - Shoulder  External Rotation Reactive Isometrics  - 1 x daily - 7 x weekly - 3 sets - 10 reps    Charges: Therex: 15 minutes  Theract: 15 minutes  NMR: 10 minutes  Manual Therapy: 0 minutes  Total Timed Treatment: 40 min  Total Treatment Time: 40 min    Plan: Pt discharged from skilled Physical Therapy.     Patient/Family/Caregiver was advised of these findings, precautions, and treatment options and has agreed to actively participate in planning and for this course of care.    Thank you for your referral. If you have any questions, please contact me at Dept: [phone].    Sincerely,  Electronically signed by therapist: Yandy Ramirez PT     Please co-sign or sign and return this letter via fax as soon as possible to [phone].   I certify the need for these services furnished under this plan of treatment and while under my care.    X___________________________________________________ Date____________________    Certification From: 8/6/2024  To:11/4/2024

## 2024-08-06 ENCOUNTER — OFFICE VISIT (OUTPATIENT)
Dept: PHYSICAL THERAPY | Age: 74
End: 2024-08-06
Attending: ORTHOPAEDIC SURGERY
Payer: MEDICARE

## 2024-08-06 DIAGNOSIS — M67.912 TENDINOPATHY OF LEFT SHOULDER: Primary | ICD-10-CM

## 2024-08-06 PROCEDURE — 97112 NEUROMUSCULAR REEDUCATION: CPT | Performed by: PHYSICAL THERAPIST

## 2024-08-06 PROCEDURE — 97110 THERAPEUTIC EXERCISES: CPT | Performed by: PHYSICAL THERAPIST

## 2024-08-06 PROCEDURE — 97530 THERAPEUTIC ACTIVITIES: CPT | Performed by: PHYSICAL THERAPIST

## 2024-08-13 ENCOUNTER — APPOINTMENT (OUTPATIENT)
Dept: PHYSICAL THERAPY | Age: 74
End: 2024-08-13
Attending: ORTHOPAEDIC SURGERY
Payer: MEDICARE

## 2024-08-15 ENCOUNTER — APPOINTMENT (OUTPATIENT)
Dept: PHYSICAL THERAPY | Age: 74
End: 2024-08-15
Attending: ORTHOPAEDIC SURGERY
Payer: MEDICARE

## 2024-08-20 ENCOUNTER — APPOINTMENT (OUTPATIENT)
Dept: PHYSICAL THERAPY | Age: 74
End: 2024-08-20
Attending: ORTHOPAEDIC SURGERY
Payer: MEDICARE

## 2024-09-28 ENCOUNTER — LAB ENCOUNTER (OUTPATIENT)
Dept: LAB | Age: 74
End: 2024-09-28
Attending: INTERNAL MEDICINE
Payer: MEDICARE

## 2024-09-28 ENCOUNTER — OFFICE VISIT (OUTPATIENT)
Dept: INTERNAL MEDICINE CLINIC | Facility: CLINIC | Age: 74
End: 2024-09-28

## 2024-09-28 VITALS
BODY MASS INDEX: 39.47 KG/M2 | HEART RATE: 61 BPM | OXYGEN SATURATION: 96 % | DIASTOLIC BLOOD PRESSURE: 83 MMHG | RESPIRATION RATE: 18 BRPM | HEIGHT: 64 IN | WEIGHT: 231.19 LBS | SYSTOLIC BLOOD PRESSURE: 138 MMHG

## 2024-09-28 DIAGNOSIS — E66.01 CLASS 2 SEVERE OBESITY WITH SERIOUS COMORBIDITY AND BODY MASS INDEX (BMI) OF 36.0 TO 36.9 IN ADULT, UNSPECIFIED OBESITY TYPE (HCC): ICD-10-CM

## 2024-09-28 DIAGNOSIS — I10 ESSENTIAL HYPERTENSION: ICD-10-CM

## 2024-09-28 DIAGNOSIS — G62.9 NEUROPATHY: ICD-10-CM

## 2024-09-28 DIAGNOSIS — Z00.00 ENCOUNTER FOR ANNUAL HEALTH EXAMINATION: Primary | ICD-10-CM

## 2024-09-28 DIAGNOSIS — Z96.651 S/P TKR (TOTAL KNEE REPLACEMENT), RIGHT: ICD-10-CM

## 2024-09-28 DIAGNOSIS — G47.33 OSA (OBSTRUCTIVE SLEEP APNEA): ICD-10-CM

## 2024-09-28 LAB
ALBUMIN SERPL-MCNC: 4.4 G/DL (ref 3.2–4.8)
ALBUMIN/GLOB SERPL: 1.6 {RATIO} (ref 1–2)
ALP LIVER SERPL-CCNC: 78 U/L
ALT SERPL-CCNC: 10 U/L
ANION GAP SERPL CALC-SCNC: 5 MMOL/L (ref 0–18)
AST SERPL-CCNC: 19 U/L (ref ?–34)
BASOPHILS # BLD AUTO: 0.04 X10(3) UL (ref 0–0.2)
BASOPHILS NFR BLD AUTO: 0.6 %
BILIRUB SERPL-MCNC: 0.9 MG/DL (ref 0.2–1.1)
BUN BLD-MCNC: 17 MG/DL (ref 9–23)
BUN/CREAT SERPL: 23 (ref 10–20)
CALCIUM BLD-MCNC: 10.4 MG/DL (ref 8.7–10.4)
CHLORIDE SERPL-SCNC: 104 MMOL/L (ref 98–112)
CHOLEST SERPL-MCNC: 174 MG/DL (ref ?–200)
CO2 SERPL-SCNC: 32 MMOL/L (ref 21–32)
CREAT BLD-MCNC: 0.74 MG/DL
DEPRECATED RDW RBC AUTO: 44.4 FL (ref 35.1–46.3)
EGFRCR SERPLBLD CKD-EPI 2021: 84 ML/MIN/1.73M2 (ref 60–?)
EOSINOPHIL # BLD AUTO: 0.25 X10(3) UL (ref 0–0.7)
EOSINOPHIL NFR BLD AUTO: 3.9 %
ERYTHROCYTE [DISTWIDTH] IN BLOOD BY AUTOMATED COUNT: 13 % (ref 11–15)
FASTING PATIENT LIPID ANSWER: YES
FASTING STATUS PATIENT QL REPORTED: YES
GLOBULIN PLAS-MCNC: 2.8 G/DL (ref 2–3.5)
GLUCOSE BLD-MCNC: 90 MG/DL (ref 70–99)
HCT VFR BLD AUTO: 44.5 %
HDLC SERPL-MCNC: 56 MG/DL (ref 40–59)
HGB BLD-MCNC: 15.2 G/DL
IMM GRANULOCYTES # BLD AUTO: 0.02 X10(3) UL (ref 0–1)
IMM GRANULOCYTES NFR BLD: 0.3 %
LDLC SERPL CALC-MCNC: 106 MG/DL (ref ?–100)
LYMPHOCYTES # BLD AUTO: 1.44 X10(3) UL (ref 1–4)
LYMPHOCYTES NFR BLD AUTO: 22.3 %
MCH RBC QN AUTO: 31.2 PG (ref 26–34)
MCHC RBC AUTO-ENTMCNC: 34.2 G/DL (ref 31–37)
MCV RBC AUTO: 91.4 FL
MONOCYTES # BLD AUTO: 0.54 X10(3) UL (ref 0.1–1)
MONOCYTES NFR BLD AUTO: 8.4 %
NEUTROPHILS # BLD AUTO: 4.17 X10 (3) UL (ref 1.5–7.7)
NEUTROPHILS # BLD AUTO: 4.17 X10(3) UL (ref 1.5–7.7)
NEUTROPHILS NFR BLD AUTO: 64.5 %
NONHDLC SERPL-MCNC: 118 MG/DL (ref ?–130)
OSMOLALITY SERPL CALC.SUM OF ELEC: 293 MOSM/KG (ref 275–295)
PLATELET # BLD AUTO: 191 10(3)UL (ref 150–450)
POTASSIUM SERPL-SCNC: 3.8 MMOL/L (ref 3.5–5.1)
PROT SERPL-MCNC: 7.2 G/DL (ref 5.7–8.2)
RBC # BLD AUTO: 4.87 X10(6)UL
SODIUM SERPL-SCNC: 141 MMOL/L (ref 136–145)
TRIGL SERPL-MCNC: 65 MG/DL (ref 30–149)
TSI SER-ACNC: 1.76 MIU/ML (ref 0.55–4.78)
VIT B12 SERPL-MCNC: 630 PG/ML (ref 211–911)
VLDLC SERPL CALC-MCNC: 11 MG/DL (ref 0–30)
WBC # BLD AUTO: 6.5 X10(3) UL (ref 4–11)

## 2024-09-28 PROCEDURE — 80053 COMPREHEN METABOLIC PANEL: CPT

## 2024-09-28 PROCEDURE — 84443 ASSAY THYROID STIM HORMONE: CPT

## 2024-09-28 PROCEDURE — 36415 COLL VENOUS BLD VENIPUNCTURE: CPT

## 2024-09-28 PROCEDURE — 85025 COMPLETE CBC W/AUTO DIFF WBC: CPT

## 2024-09-28 PROCEDURE — 80061 LIPID PANEL: CPT

## 2024-09-28 PROCEDURE — 82607 VITAMIN B-12: CPT

## 2024-09-28 RX ORDER — AMLODIPINE BESYLATE 10 MG/1
10 TABLET ORAL EVERY MORNING
Qty: 90 TABLET | Refills: 3 | Status: SHIPPED | OUTPATIENT
Start: 2024-09-28

## 2024-09-28 RX ORDER — DULOXETIN HYDROCHLORIDE 30 MG/1
30 CAPSULE, DELAYED RELEASE ORAL DAILY
Qty: 90 CAPSULE | Refills: 1 | Status: SHIPPED | OUTPATIENT
Start: 2024-09-28

## 2024-09-28 RX ORDER — VALSARTAN AND HYDROCHLOROTHIAZIDE 320; 25 MG/1; MG/1
1 TABLET, FILM COATED ORAL EVERY MORNING
Qty: 90 TABLET | Refills: 3 | Status: SHIPPED | OUTPATIENT
Start: 2024-09-28

## 2024-09-28 NOTE — PROGRESS NOTES
Subjective:   Rohini Wolff is a 74 year old female who presents for a MA AHA (Medicare Advantage Annual Health Assessment) and Subsequent Annual Wellness visit (Pt already had Initial Annual Wellness) and scheduled follow up of multiple significant but stable problems.     Patient is here requesting Medicare advantage AHA visit.  Last seen here 1 year ago for the same reason.  At that time weight was still substantially down.  She went from 290 pounds down to 197 pounds.  Then up to 215 pounds.  At that time last year she been diagnosed with peripheral neuropathy from neurology.  Blood work at that time was normal as was the FIT testing for blood in the stool.  Since that visit she has seen nurse practitioner here as well as orthopedics and physical therapy for left shoulder issues.  Current medications reviewed.  Health maintenance and vaccination status reviewed.  Advanced directives reviewed.     She can no longer walk 2 miles a day due to her legs. Cannot climb the ladder to get in and out of the pool. She occasionally rides the stat bike. Her legs hurt every day. Her left shoulder was better but is now acting up. The legs hurt from the upper legs down. It hurts first thing in the morning. The legs tingle and they don't want to work. Feels like she is walking on stumps. She is not having claudication or joint pain. Feels like she is trying to walk through mud. She does not trust her BP cuff at home. Does not use CPAP at night; she tried it a long time ago and did not tolerate it. She is trying to watch what she is eating. She is not using meloxicam as it does not help the pain. No tobacco. Rare use of EtOH.         History/Other:   Fall Risk Assessment:   She has been screened for Falls and is High Risk. Fall Prevention information provided to patient in After Visit Summary.    Do you feel unsteady when standing or walking?: Yes  Do you worry about falling?: Yes  Have you fallen in the past year?: No      Cognitive Assessment:   She had a completely normal cognitive assessment - see flowsheet entries     Functional Ability/Status:   Rohini Wolff has some abnormal functions as listed below:  She has Hearing problems based on screening of functional status.She has Vision problems based on screening of functional status. She has Walking problems based on screening of functional status.       Depression Screening (PHQ):  PHQ-2 SCORE: 0  , done 9/28/2024   If you checked off any problems, how difficult have these problems made it for you to do your work, take care of things at home, or get along with other people?: Not difficult at all              Advanced Directives:   She has a Living Will on file in [facility]; reviewed and discussed documents with patient (and family/surrogate if present).  She does have a POA but we do NOT have it on file in Epic.    Discussed Advance Care Planning with patient (and family/surrogate if present). Standard forms made available to patient in After Visit Summary.      Patient Active Problem List   Diagnosis    Osteoarthrosis, unspecified whether generalized or localized, lower leg    Essential hypertension    Obesity    Sleep apnea    Primary osteoarthritis of both knees    left shoulder rcr sx global exp 11/29/17    Epidermal cyst    Rosacea    Bilateral leg edema    Status post total right knee replacement     Allergies:  She is allergic to estrogens; metoprolol succinate [metoprolol]; benazepril; estrogens, conjugated; and penicillin g.    Current Medications:  Outpatient Medications Marked as Taking for the 9/28/24 encounter (Office Visit) with Kenroy Morrison MD   Medication Sig    amLODIPine 10 MG Oral Tab Take 1 tablet (10 mg total) by mouth every morning.    Valsartan-hydroCHLOROthiazide 320-25 MG Oral Tab Take 1 tablet by mouth every morning.    acetaminophen 325 MG Oral Tab Take 2 tablets (650 mg total) by mouth 2 (two) times daily as needed for Pain.    ASPIRIN 81 OR  Take by mouth As Directed.       Medical History:  She  has a past medical history of Back problem, H/O: hysterectomy (), Hearing impairment, High blood pressure, Osteoarthritis, Other ill-defined conditions(799.89) (), Sleep apnea, Visual impairment, and Wears dentures.  Surgical History:  She  has a past surgical history that includes tonsillectomy; cardiac catherterization (pbp) (); repair of hammertoe,one (Left); arthroscopy of joint unlisted; correct bunion,othr methods (Bilateral); hysterectomy; and repair rotator cuff,acute (Left, 2016).   Family History:  Her family history includes Breast Cancer (age of onset: 70) in her brother; Cancer in her brother, brother, father, and mother; Diabetes in her brother, brother, father, paternal grandmother, and another family member; Heart Disorder in her brother; Lipids in her brother.  Social History:  She  reports that she quit smoking about 39 years ago. Her smoking use included cigarettes. She started smoking about 61 years ago. She has a 22 pack-year smoking history. She has never used smokeless tobacco. She reports that she does not drink alcohol and does not use drugs.    Tobacco:  She smoked tobacco in the past but quit greater than 12 months ago.  Social History     Tobacco Use   Smoking Status Former    Current packs/day: 0.00    Average packs/day: 1 pack/day for 22.0 years (22.0 ttl pk-yrs)    Types: Cigarettes    Start date: 1963    Quit date: 1985    Years since quittin.1   Smokeless Tobacco Never          CAGE Alcohol Screen:   CAGE screening score of 0 on 2024, showing low risk of alcohol abuse.      Patient Care Team:  Kenroy Morrison MD as PCP - General (Internal Medicine)    Review of Systems       Objective:   Physical Exam  Constitutional:       Appearance: Normal appearance. She is well-developed. She is obese.   HENT:      Right Ear: Tympanic membrane and ear canal normal.      Left Ear: Tympanic membrane and ear  canal normal.      Nose: Nose normal.      Mouth/Throat:      Pharynx: No oropharyngeal exudate or posterior oropharyngeal erythema.   Eyes:      Conjunctiva/sclera: Conjunctivae normal.      Pupils: Pupils are equal, round, and reactive to light.   Neck:      Thyroid: No thyromegaly.      Vascular: No carotid bruit.   Cardiovascular:      Rate and Rhythm: Normal rate.      Pulses: Normal pulses.      Heart sounds: Normal heart sounds. No murmur heard.     No friction rub. No gallop.   Pulmonary:      Effort: Pulmonary effort is normal.      Breath sounds: Normal breath sounds. No wheezing or rales.   Abdominal:      General: Bowel sounds are normal.      Palpations: Abdomen is soft. There is no mass.      Tenderness: There is no abdominal tenderness.   Musculoskeletal:         General: No tenderness.      Cervical back: Neck supple.      Right lower leg: No edema.      Left lower leg: No edema.   Lymphadenopathy:      Cervical: No cervical adenopathy.   Skin:     General: Skin is warm and dry.      Findings: No rash.   Neurological:      General: No focal deficit present.      Mental Status: She is alert.      Cranial Nerves: No cranial nerve deficit.      Coordination: Coordination normal.   Psychiatric:         Mood and Affect: Mood normal.         Behavior: Behavior normal.         Thought Content: Thought content normal.         Judgment: Judgment normal.            /83 (BP Location: Left arm, Patient Position: Sitting, Cuff Size: large)   Pulse 61   Resp 18   Ht 5' 4\" (1.626 m)   Wt 231 lb 3.2 oz (104.9 kg)   SpO2 96%   BMI 39.69 kg/m²  Estimated body mass index is 39.69 kg/m² as calculated from the following:    Height as of this encounter: 5' 4\" (1.626 m).    Weight as of this encounter: 231 lb 3.2 oz (104.9 kg).    Medicare Hearing Assessment:   Hearing Screening    Time taken: 9/28/2024  8:18 AM  Entry User: Olamide Machado MA  Screening Method: Finger Rub  Finger Rub Result: Pass          Visual Acuity:   Right Eye Visual Acuity: Corrected Right Eye Chart Acuity: 20/20   Left Eye Visual Acuity: Corrected Left Eye Chart Acuity: 20/25   Both Eyes Visual Acuity: Corrected Both Eyes Chart Acuity: 20/20   Able To Tolerate Visual Acuity: Yes        Assessment & Plan:   Rohini Wolff is a 74 year old female who presents for a Medicare Assessment.     1. Encounter for annual health examination  Physical exam remarkable for obesity.  Active issues as below.  Health maintenance issues reviewed.  Patient encouraged to work on diet and exercise.  Try to lose some of the weight that she has gained back recently.    2. Essential hypertension  Well-controlled when checked by nurse.  Continue current treatment.  We will check full blood work and contact patient with results.  Advised that we would like to see her at least every 6 months.  - CBC With Differential With Platelet; Future  - Comp Metabolic Panel (14); Future  - Lipid Panel; Future  - TSH W Reflex To Free T4; Future    3. MARTHA (obstructive sleep apnea)  Patient did not tolerate CPAP.  She is not interested in trying it now.    4. Neuropathy  Patient with peripheral neuropathy diagnosed last year.  No treatable etiology was found.  She is having increasing symptoms in her legs with difficulty moving around.  It does not sound like claudication/vascular disease.  Does not just sound like joint problems.  I think the leg issue is more likely from the neuropathy.  Trial of Cymbalta 30 mg.  Follow-up in 3 months.  May consider going up to 60 mg.  If this is not effective, consider gabapentin or Lyrica.  - Vitamin B12; Future    5. Class 2 severe obesity with serious comorbidity and body mass index (BMI) of 36.0 to 36.9 in adult, unspecified obesity type (HCC)  Patient lost almost 100 pounds in the recent past but now has gained back over 30 pounds.  She is frustrated.  She is not able to exercise as much because of the neuropathy.  Encouraged regular  exercise even if it is just upper body light weight exercise.  Keep working on diet.  Comorbidities of the obesity include sleep apnea and hypertension.  Treatment for those conditions are as documented above in the assessment and plan.    6. S/P TKR (total knee replacement), right  Stable.  Does not take the meloxicam.      The patient indicates understanding of these issues and agrees to the plan.  Reinforced healthy diet, lifestyle, and exercise.      No follow-ups on file.     Kenroy Morrison MD, 9/28/2024     Supplementary Documentation:   General Health:  In the past six months, have you lost more than 10 pounds without trying?: 2 - No  Has your appetite been poor?: No  Type of Diet: Balanced  How does the patient maintain a good energy level?: Appropriate Exercise;Daily Walks  How would you describe your daily physical activity?: Light  How would you describe your current health state?: Fair  How do you maintain positive mental well-being?: Social Interaction;Visiting Family;Puzzles;Games;Visiting Friends  On a scale of 0 to 10, with 0 being no pain and 10 being severe pain, what is your pain level?: 2 - (Mild)  In the past six months, have you experienced urine leakage?: 0-No  At any time do you feel concerned for the safety/well-being of yourself and/or your children, in your home or elsewhere?: No  Have you had any immunizations at another office such as Influenza, Hepatitis B, Tetanus, or Pneumococcal?: No    Health Maintenance   Topic Date Due    MA Annual Health Assessment  01/01/2024    COVID-19 Vaccine (7 - 2023-24 season) 09/01/2024    Influenza Vaccine (1) 10/01/2024    DEXA Scan  Completed    Annual Depression Screening  Completed    Fall Risk Screening (Annual)  Completed    Pneumococcal Vaccine: 65+ Years  Completed    Zoster Vaccines  Completed    Colorectal Cancer Screening  Discontinued    Mammogram  Discontinued

## 2024-10-21 ENCOUNTER — TELEPHONE (OUTPATIENT)
Dept: INTERNAL MEDICINE CLINIC | Facility: CLINIC | Age: 74
End: 2024-10-21

## 2024-10-21 NOTE — TELEPHONE ENCOUNTER
Patient states she received a message stating she is due for covid, flu vaccines.  Patient states she received the flu, covid and rsv vaccines at [facility].  Immunization query generated.  Immunization record has been updated.  Patient verbalized understanding.

## 2024-11-22 ENCOUNTER — PATIENT MESSAGE (OUTPATIENT)
Dept: FAMILY MEDICINE CLINIC | Facility: CLINIC | Age: 74
End: 2024-11-22

## 2025-04-08 ENCOUNTER — TELEPHONE (OUTPATIENT)
Dept: INTERNAL MEDICINE CLINIC | Facility: CLINIC | Age: 75
End: 2025-04-08

## 2025-04-08 NOTE — TELEPHONE ENCOUNTER
SUSAN Mcclain with [facility]; home health with insurance provider called states patient has just reported symptoms of depression since January. She also reported suicidal ideation with plan and states she will not act on them. She states patient verbalized her plan would be to take all of her BP medications. She told her she will call 911 now and patient told her she will decline if they arrive. NP called [place] police department for wellness check. This occurred about 9:40 am. Police were dispatched and she has not received a call back from the police yet. I asked if she asked for ambulance to be sent to home and she states she is in [place], [place] and they told her she needs to call [place] [id number] where patient resides for ambulance; I made her aware she may call 911 and they should transfer to appropriate dispatch for the address provided and request ambulance to go to home, EMT will provide patient with waiver to sign if she declines ambulance transport. While I was placed on hold she called 911 for ambulance to go to patient's home, they provided # for her to call, she called and they said they have officers on scene now. I made her aware I will convey the above to Dr. Morrison. She verbalized understanding. No further questions or concerns at this time.    Dr. Morrison - please advise, if any

## 2025-04-08 NOTE — TELEPHONE ENCOUNTER
Noted.  Patient was seen in the emergency room and evaluated there.  Please contact the patient tomorrow and get an update on how she is doing.  We may need to either bring her in the office or get her set up with a behavioral health specialist.

## 2025-04-09 ENCOUNTER — PATIENT OUTREACH (OUTPATIENT)
Dept: CASE MANAGEMENT | Age: 75
End: 2025-04-09

## 2025-04-09 NOTE — TELEPHONE ENCOUNTER
Patient called back, verified name/.  Update:  Feels they overreacted at the ED, she does not have suicidal ideation.  Feels she has \"stablized\" and overall feels OK.  She will be leaving for [place] this week to visit her son.  Will be back 2025.  She requested and scheduled appointment with Romelia MOTLEY.    Routed to Dr Morrison as ASIA.    Future Appointments   Date Time Provider Department Center   2025  9:00 AM Romelia Goncalves APRN [facility]   2025  8:00 AM Kenroy Morrison MD [facility]

## 2025-04-09 NOTE — TELEPHONE ENCOUNTER
Attempted to call and follow up on patient. Patient was left a message to call back. Transfer to triage dept

## 2025-04-09 NOTE — PROGRESS NOTES
NCM attempted to contact the patient for CHERELLE. Phone rang twice and VM appeared to turn on but there was just silence before disconnecting. NCM will try again later.

## 2025-04-21 ENCOUNTER — OFFICE VISIT (OUTPATIENT)
Dept: INTERNAL MEDICINE CLINIC | Facility: CLINIC | Age: 75
End: 2025-04-21
Payer: MEDICARE

## 2025-04-21 VITALS
OXYGEN SATURATION: 96 % | RESPIRATION RATE: 18 BRPM | DIASTOLIC BLOOD PRESSURE: 72 MMHG | HEART RATE: 65 BPM | TEMPERATURE: 97 F | BODY MASS INDEX: 43.16 KG/M2 | HEIGHT: 64 IN | SYSTOLIC BLOOD PRESSURE: 116 MMHG | WEIGHT: 252.81 LBS

## 2025-04-21 DIAGNOSIS — G62.9 NEUROPATHY: ICD-10-CM

## 2025-04-21 DIAGNOSIS — Z09 HOSPITAL DISCHARGE FOLLOW-UP: Primary | ICD-10-CM

## 2025-04-21 DIAGNOSIS — F41.8 SITUATIONAL ANXIETY: ICD-10-CM

## 2025-04-21 DIAGNOSIS — R94.31 ABNORMAL EKG: ICD-10-CM

## 2025-04-21 DIAGNOSIS — I10 ESSENTIAL HYPERTENSION: ICD-10-CM

## 2025-04-21 PROCEDURE — 1125F AMNT PAIN NOTED PAIN PRSNT: CPT | Performed by: NURSE PRACTITIONER

## 2025-04-21 PROCEDURE — 3074F SYST BP LT 130 MM HG: CPT | Performed by: NURSE PRACTITIONER

## 2025-04-21 PROCEDURE — 3008F BODY MASS INDEX DOCD: CPT | Performed by: NURSE PRACTITIONER

## 2025-04-21 PROCEDURE — 3078F DIAST BP <80 MM HG: CPT | Performed by: NURSE PRACTITIONER

## 2025-04-21 PROCEDURE — 1160F RVW MEDS BY RX/DR IN RCRD: CPT | Performed by: NURSE PRACTITIONER

## 2025-04-21 PROCEDURE — 1159F MED LIST DOCD IN RCRD: CPT | Performed by: NURSE PRACTITIONER

## 2025-04-21 PROCEDURE — 99214 OFFICE O/P EST MOD 30 MIN: CPT | Performed by: NURSE PRACTITIONER

## 2025-04-21 RX ORDER — ESCITALOPRAM OXALATE 5 MG/1
5 TABLET ORAL DAILY
Qty: 30 TABLET | Refills: 0 | Status: SHIPPED | OUTPATIENT
Start: 2025-04-21

## 2025-04-21 NOTE — PROGRESS NOTES
Rohini Wolff is a 74 year old female.  Chief Complaint   Patient presents with    ER F/U     Was in ER 4-8-25    Anxiety    Depression     HPI:   She presents for hospital follow up. She went to the ER on 4/8 by EMS and police dept after she told the medicare nurse on a telehealth visit she considered killing herself.    She has had stress since the election. She is worried about social security being taken away. She is worried about caring for herself. She is currently living with her oldest daughter.     She has neuropathy in feet and in the legs. She has done therapy in the past with little relief. She was prescribed Cymbalta for her pain. She took it for one month. She did not notice a difference.     She was given resources in the ER for therapy.   She is willing to take medication to help with her mood. She has not taken medication in the past.     She does not want to do a lot. She does play cards three times a week. She does talk to a friend daily.  She admits to getting angry easily.    She is disappointed in herself due to weight gain. She over eats due to how she is feeling.       EKG in the ER showed   Sinus rhythm with Premature supraventricular complexes   Left axis deviation   Low voltage QRS, consider pulmonary disease, pericardial effusion, or normal variant   Inferior infarct , age undetermined   Cannot rule out Anterior infarct , age undetermined   Abnormal ECG       Previous EKG 6/22/2021  Sinus Bradycardia   Low voltage in precordial leads.    -Anteroseptal infarct -age undetermined -Old inferior infarct.   ABNORMAL     Current Medications[1]   Past Medical History[2]   Past Surgical History[3]   Social History:  Short Social Hx on File[4]   Family History[5]   Allergies[6]     REVIEW OF SYSTEMS:     Review of Systems   Constitutional:  Negative for fever.   HENT: Negative.     Respiratory:  Negative for cough, shortness of breath and wheezing.    Cardiovascular:  Negative for chest pain.    Gastrointestinal:  Negative for abdominal pain.   Genitourinary: Negative.    Musculoskeletal:  Positive for arthralgias and gait problem (cane).   Skin: Negative.    Psychiatric/Behavioral:  The patient is nervous/anxious.       Wt Readings from Last 5 Encounters:   04/21/25 252 lb 12.8 oz (114.7 kg)   04/08/25 220 lb (99.8 kg)   09/28/24 231 lb 3.2 oz (104.9 kg)   06/26/24 228 lb (103.4 kg)   05/13/24 226 lb (102.5 kg)     Body mass index is 43.39 kg/m².      EXAM:   /72 (BP Location: Right arm, Patient Position: Sitting, Cuff Size: adult)   Pulse 65   Temp 97 °F (36.1 °C) (Temporal)   Resp 18   Ht 5' 4\" (1.626 m)   Wt 252 lb 12.8 oz (114.7 kg)   SpO2 96%   BMI 43.39 kg/m²     Physical Exam  Vitals reviewed.   Constitutional:       Appearance: Normal appearance.   HENT:      Head: Normocephalic.   Cardiovascular:      Rate and Rhythm: Normal rate and regular rhythm.      Pulses: Normal pulses.   Pulmonary:      Breath sounds: Normal breath sounds. No wheezing.   Musculoskeletal:         General: No swelling. Normal range of motion.   Skin:     General: Skin is warm and dry.   Neurological:      Mental Status: She is alert and oriented to person, place, and time.   Psychiatric:         Mood and Affect: Mood normal.         Behavior: Behavior normal.            ASSESSMENT AND PLAN:   1. Hospital discharge follow-up  - hospital notes, imaging and labs reviewed     2. Situational anxiety  - start low dose lexapro and follow up in one month   - escitalopram (LEXAPRO) 5 MG Oral Tab; Take 1 tablet (5 mg total) by mouth daily.  Dispense: 30 tablet; Refill: 0  - [facility] Referral - In Network    3. Essential hypertension  - CPM  - BP stable in office     4. Neuropathy  - patient did try Cymbalta with no change in her symptoms  - offered therapy she declined.     5. Abnormal EKG  - per patient she has had this in the past. Last stress test 7/2021  - she denies any SOB or chest pain.   - CARD ECHO 2D DOPPLER  (CPT=93306); Future      The patient indicates understanding of these issues and agrees to the plan.  Return in about 1 month (around 2025).       [1]   Current Outpatient Medications   Medication Sig Dispense Refill    escitalopram (LEXAPRO) 5 MG Oral Tab Take 1 tablet (5 mg total) by mouth daily. 30 tablet 0    amLODIPine 10 MG Oral Tab Take 1 tablet (10 mg total) by mouth every morning. 90 tablet 3    Valsartan-hydroCHLOROthiazide 320-25 MG Oral Tab Take 1 tablet by mouth every morning. 90 tablet 3    acetaminophen 325 MG Oral Tab Take 2 tablets (650 mg total) by mouth 2 (two) times daily as needed for Pain.      ASPIRIN 81 OR Take by mouth As Directed.     [2]   Past Medical History:   Back problem    low back pain    H/O: hysterectomy    OVARIES LEFT IN PER. NG    Hearing impairment    bilateral hearing aids    High blood pressure    Osteoarthritis    bilateral knees and bilateral shoulders    Other ill-defined conditions(799.89)    ADENOSINE-THALLIUM: QUESTION OF FIXED DEFECT AT [facility]. PER. NG    Sleep apnea    had CPAP but has not used in several years    Visual impairment    glasses    Wears dentures    upper and lower   [3]   Past Surgical History:  Procedure Laterality Date    Arthroscopy of joint unlisted      R knee 20 yrs ago    Cardiac catherterization (pbp)      Correct bunion,othr methods Bilateral     Hysterectomy      partial-uterus only    Repair of hammertoe,one Left     Repair rotator cuff,acute Left 2016    Tonsillectomy     [4]   Social History  Socioeconomic History    Marital status:    Tobacco Use    Smoking status: Former     Current packs/day: 0.00     Average packs/day: 1 pack/day for 22.0 years (22.0 ttl pk-yrs)     Types: Cigarettes     Start date: 1963     Quit date: 1985     Years since quittin.6    Smokeless tobacco: Never   Vaping Use    Vaping status: Never Used   Substance and Sexual Activity    Alcohol use: No    Drug use: No    Sexual activity:  Not Currently     Partners: Male     Social Drivers of Health     Food Insecurity: No Food Insecurity (4/21/2025)    NCSS - Food Insecurity     Worried About Running Out of Food in the Last Year: No     Ran Out of Food in the Last Year: No   Transportation Needs: No Transportation Needs (4/21/2025)    NCSS - Transportation     Lack of Transportation: No   Housing Stability: At Risk (4/21/2025)    NCSS - Housing/Utilities     Has Housing: Yes     Worried About Losing Housing: Yes     Unable to Get Utilities: No   [5]   Family History  Problem Relation Age of Onset    Cancer Mother         LARYNX    Diabetes Father     Cancer Father         LARYNX    Breast Cancer Brother 70    Diabetes Brother     Cancer Brother     Heart Disorder Brother     Diabetes Brother     Cancer Brother         LEUKEMIA    Lipids Brother     Diabetes Paternal Grandmother     Diabetes Other    [6]   Allergies  Allergen Reactions    Estrogens BLEEDING     Other reaction(s): ESTROGENS, CONJUGATED  headaches      Metoprolol Succinate [Metoprolol] OTHER (SEE COMMENTS)     Other reaction(s): Bradycardia    Benazepril FACE FLUSHING     Other reaction(s): Flushing    Estrogens, Conjugated BLEEDING     Other reaction(s): ESTROGENS, CONJUGATED  headaches    Penicillin G HIVES     Other reaction(s): PENICILLIN G POTASSIUM

## 2025-05-12 NOTE — PROGRESS NOTES
Contacted patient to schedule consult related to [facility] referral. Left the patient a voicemail message and also sent a MyChart message.

## 2025-05-13 ENCOUNTER — HOSPITAL ENCOUNTER (OUTPATIENT)
Dept: CV DIAGNOSTICS | Facility: HOSPITAL | Age: 75
Discharge: HOME | End: 2025-05-13
Attending: NURSE PRACTITIONER
Payer: MEDICARE

## 2025-05-13 DIAGNOSIS — R94.31 ABNORMAL EKG: ICD-10-CM

## 2025-05-13 PROCEDURE — 93306 TTE W/DOPPLER COMPLETE: CPT | Performed by: NURSE PRACTITIONER

## 2025-07-03 ENCOUNTER — MED REC SCAN ONLY (OUTPATIENT)
Dept: INTERNAL MEDICINE CLINIC | Facility: CLINIC | Age: 75
End: 2025-07-03

## (undated) DIAGNOSIS — H26.9 CATARACT, UNSPECIFIED CATARACT TYPE, UNSPECIFIED LATERALITY: Primary | ICD-10-CM

## (undated) DEVICE — SOL  .9 1000ML BTL

## (undated) DEVICE — SUTURE ETHIBOND 2 V-37

## (undated) DEVICE — NEEDLE SCORPION AR-13995N

## (undated) DEVICE — Device: Brand: STABLECUT®

## (undated) DEVICE — 450 ML BOTTLE OF 0.05% CHLORHEXIDINE GLUCONATE IN 99.95% STERILE WATER FOR IRRIGATION, USP AND APPLICATOR.: Brand: IRRISEPT ANTIMICROBIAL WOUND LAVAGE

## (undated) DEVICE — COVER SGL STRL LGHT HNDL BLU

## (undated) DEVICE — SPECIMEN CONTAINER,POSITIVE SEAL INDICATOR, OR PACKAGED: Brand: PRECISION

## (undated) DEVICE — SYRINGE 20CC LL TIP

## (undated) DEVICE — STERILE POLYISOPRENE POWDER-FREE SURGICAL GLOVES: Brand: PROTEXIS

## (undated) DEVICE — 3M™ IOBAN™ 2 ANTIMICROBIAL INCISE DRAPE 6651EZ: Brand: IOBAN™ 2

## (undated) DEVICE — NON-ADHERENT STRIPS,OIL EMULSION: Brand: CURITY

## (undated) DEVICE — SHOULDER: Brand: MEDLINE INDUSTRIES, INC.

## (undated) DEVICE — SOL  .9 3000ML

## (undated) DEVICE — BOWL CEMENT MIX QUICK-VAC

## (undated) DEVICE — 12 ML SYRINGE LUER-LOCK TIP: Brand: MONOJECT

## (undated) DEVICE — ADHESIVE MASTISOL 2/3CC VL

## (undated) DEVICE — PEN 6\" SURGICAL MARKING PURPL

## (undated) DEVICE — DISPOSABLE TOURNIQUET CUFF SINGLE BLADDER, DUAL PORT AND QUICK CONNECT CONNECTOR: Brand: COLOR CUFF

## (undated) DEVICE — SPECIALTY ARM SLING: Brand: DEROYAL

## (undated) DEVICE — CHLORAPREP 26ML APPLICATOR

## (undated) DEVICE — Device: Brand: PLUMEPEN

## (undated) DEVICE — WRAP COOLING KNEE W/ICE PILLOW

## (undated) DEVICE — SUTURE VICRYL 0 CP-1

## (undated) DEVICE — SUTURE STRATAFIX PDS PLUS 45CM

## (undated) DEVICE — 3M™ STERI-DRAPE™ U-DRAPE 1015: Brand: STERI-DRAPE™

## (undated) DEVICE — ATTUNE PINNING SYSTEM: Brand: ATTUNE

## (undated) DEVICE — DRAPE,U/SHT,SPLIT,FILM,60X84,STERILE: Brand: MEDLINE

## (undated) DEVICE — SCD SLEEVE KNEE HI BLEND

## (undated) DEVICE — CONVERTORS STOCKINETTE: Brand: CONVERTORS

## (undated) DEVICE — SUTURE VICRYL 2-0 FS-1

## (undated) DEVICE — STANDARD HYPODERMIC NEEDLE,POLYPROPYLENE HUB: Brand: MONOJECT

## (undated) DEVICE — SUTURE VICRYL 2-0 CP-1

## (undated) DEVICE — SUTURE PROLENE 3-0 8687H

## (undated) DEVICE — TOTAL KNEE CDS: Brand: MEDLINE INDUSTRIES, INC.

## (undated) DEVICE — HOOD, PEEL-AWAY: Brand: FLYTE

## (undated) DEVICE — GOWN SURG AERO CHROME XXL

## (undated) DEVICE — 1010 S-DRAPE TOWEL DRAPE 10/BX: Brand: STERI-DRAPE™

## (undated) DEVICE — HOOD: Brand: FLYTE

## (undated) DEVICE — Device: Brand: JELCO

## (undated) DEVICE — UNIVERSAL STERIBUMP® STERILE (5/CASE): Brand: UNIVERSAL STERIBUMP®

## (undated) DEVICE — PADDING CAST COTTON  4

## (undated) DEVICE — PROXIMATE SKIN STAPLERS (35 WIDE) CONTAINS 35 STAINLESS STEEL STAPLES (FIXED HEAD): Brand: PROXIMATE

## (undated) DEVICE — Device

## (undated) DEVICE — STERILE LATEX POWDER-FREE SURGICAL GLOVESWITH NITRILE COATING: Brand: PROTEXIS

## (undated) DEVICE — T-MAX DISPOSABLE FACE MASK 8 PER BOX

## (undated) DEVICE — KENDALL SCD EXPRESS SLEEVES, KNEE LENGTH, X-LARGE: Brand: KENDALL SCD

## (undated) DEVICE — 3M™ STERI-STRIP™ REINFORCED ADHESIVE SKIN CLOSURES, R1547, 1/2 IN X 4 IN (12 MM X 100 MM), 6 STRIPS/ENVELOPE: Brand: 3M™ STERI-STRIP™

## (undated) DEVICE — LIGHT HANDLE

## (undated) DEVICE — SUTURE VICRYL 1 CPX

## (undated) DEVICE — DRAPE SRG 70X60IN SPLT U IMPRV

## (undated) NOTE — LETTER
Patient Name: Zeeshan Estrada  YOB: 1950          MRN number:  [id number]  Date:  9/25/2017  Referring Physician:  Annel March          POST-OP SHOULDER EVALUATION:    Referring Physician: Dr. Romel Flores  Diagnosis: s/p L mini open RC rep ER: R 90; L nt  IR: R 80; L nt  AROM L shoulder not tested secondary to surgical protocol restrictions Flexion: R wnl; L wnl  Extension: R wnl; L wnl     PROM:   Shoulder    Flexion: R nt; L 90  Abduction: R nt; L 90  ER: R 30; L 30-0 deg abd  IR: R nt; L progress achieved in PT (12 visits)    Frequency / Duration: Patient will be seen for 2 x/week or a total of 12 visits over a 90 day period. Treatment will include: Manual Therapy; Therapeutic Exercises; Neuromuscular Re-education;  Therapeutic Activity; E

## (undated) NOTE — LETTER
Patient Name: Leslie Valdes  YOB: 1950          MRN number:  [id number]  Date:  11/6/2017  Referring Physician:  Elyssa Paul     Discharge Summary    Pt has attended 12, cancelled 0, and no shown 0 visits in Physical Therapy.      Dx: ability to don deodorant, don/doff shirts, and wash hair-MET  · Pt will increase shoulder AROM to 85 deg ER and 90 deg ABD to reach and fasten seatbelt -ALMOST MET  · Pt will increase shoulder AROM IR to 70 deg to be able to reach in back pocket, tuck in s

## (undated) NOTE — LETTER
Patient Name: Mulu Suarez  YOB: 1950          MRN number:  [id number]  Date:  10/18/2017  Referring Physician:  Ana Rees     Progress Summary    Pt has attended 8, cancelled 0, and no shown 0 visits in Physical Therapy.        Dx pocket, tuck in shirt, and turn steering wheel without pain-PROGRESSING  · Pt will improve shoulder strength throughout to 4+/5 to improve function with ADLs including lifting 5# overhead-PROGRESSING  · Pt will demonstrate increased mid/low trap strength t

## (undated) NOTE — LETTER
AUTHORIZATION FOR SURGICAL OPERATION OR OTHER PROCEDURE    1. I hereby authorize Dr. Fitzpatrick/ Holly Gay PA-C, and [facility] staff assigned to my case to perform the following operation and/or procedure at the [facility] site:    _______________________________________________________________________________________________    Cortisone Injection to Left shoulder  _______________________________________________________________________________________________    2.  My physician has explained the nature and purpose of the operation or other procedure, possible alternative methods of treatment, the risks involved, and the possibility of complication to me.  I acknowledge that no guarantee has been made as to the result that may be obtained.  3.  I recognize that, during the course of this operation, or other procedure, unforseen conditions may necessitate additional or different procedure than those listed above.  I, therefore, further authorize and request that the above named physician, his/her physician assistants or designees perform such procedures as are, in his/her professional opinion, necessary and desirable.  4.  Any tissue or organs removed in the operation or other procedure may be disposed of by and at the discretion of the [facility] and [facility].  5.  I understand that in the event of a medical emergency, I will be transported by local paramedics to [facility] or other hospital emergency department.  6.  I certify that I have read and fully understand the above consent to operation and/or other procedure.    7.  I acknowledge that my physician has explained sedation/analgesia administration to me including the risks and benefits.  I consent to the administration of sedation/analgesia as may be necessary or desirable in the judgement of my physician.    Witness signature: ___________________________________________________ Date:   ______/______/_____                    Time:  ________ A.M.  P.M.       Patient Name:  ______________________________________________________  (please print)      Patient signature:  ___________________________________________________             Relationship to Patient:           []  Parent    Responsible person                          []  Spouse  In case of minor or                    [] Other  _____________   Incompetent name:  __________________________________________________                               (please print)      _____________      Responsible person  In case of minor or  Incompetent signature:  _______________________________________________    Statement of Physician  My signature below affirms that prior to the time of the procedure, I have explained to the patient and/or his/her guardian, the risks and benefits involved in the proposed treatment and any reasonable alternative to the proposed treatment.  I have also explained the risks and benefits involved in the refusal of the proposed treatment and have answered the patient's questions.                        Date:  ______/______/_______  Provider                      Signature:  __________________________________________________________       Time:  ___________ A.M    P.M.